# Patient Record
Sex: MALE | Race: WHITE
[De-identification: names, ages, dates, MRNs, and addresses within clinical notes are randomized per-mention and may not be internally consistent; named-entity substitution may affect disease eponyms.]

---

## 2022-11-26 ENCOUNTER — HOSPITAL ENCOUNTER (INPATIENT)
Dept: HOSPITAL 75 - ER | Age: 77
LOS: 4 days | Discharge: HOME | DRG: 186 | End: 2022-11-30
Attending: INTERNAL MEDICINE | Admitting: INTERNAL MEDICINE
Payer: MEDICARE

## 2022-11-26 VITALS — WEIGHT: 243.17 LBS | BODY MASS INDEX: 32.94 KG/M2 | HEIGHT: 72.01 IN

## 2022-11-26 VITALS — DIASTOLIC BLOOD PRESSURE: 74 MMHG | SYSTOLIC BLOOD PRESSURE: 96 MMHG

## 2022-11-26 VITALS — SYSTOLIC BLOOD PRESSURE: 109 MMHG | DIASTOLIC BLOOD PRESSURE: 77 MMHG

## 2022-11-26 VITALS — SYSTOLIC BLOOD PRESSURE: 112 MMHG | DIASTOLIC BLOOD PRESSURE: 77 MMHG

## 2022-11-26 VITALS — DIASTOLIC BLOOD PRESSURE: 95 MMHG | SYSTOLIC BLOOD PRESSURE: 142 MMHG

## 2022-11-26 DIAGNOSIS — J44.1: ICD-10-CM

## 2022-11-26 DIAGNOSIS — M19.91: ICD-10-CM

## 2022-11-26 DIAGNOSIS — Z20.822: ICD-10-CM

## 2022-11-26 DIAGNOSIS — F17.220: ICD-10-CM

## 2022-11-26 DIAGNOSIS — E66.9: ICD-10-CM

## 2022-11-26 DIAGNOSIS — I10: ICD-10-CM

## 2022-11-26 DIAGNOSIS — J90: Primary | ICD-10-CM

## 2022-11-26 DIAGNOSIS — G47.33: ICD-10-CM

## 2022-11-26 DIAGNOSIS — E03.9: ICD-10-CM

## 2022-11-26 DIAGNOSIS — H54.7: ICD-10-CM

## 2022-11-26 DIAGNOSIS — J96.01: ICD-10-CM

## 2022-11-26 DIAGNOSIS — H91.90: ICD-10-CM

## 2022-11-26 LAB
ALBUMIN SERPL-MCNC: 3.3 GM/DL (ref 3.2–4.5)
ALP SERPL-CCNC: 82 U/L (ref 40–136)
ALT SERPL-CCNC: 27 U/L (ref 0–55)
APTT PPP: YELLOW S
ARTERIAL PATENCY WRIST A: (no result)
ARTERIAL PATENCY WRIST A: (no result)
BACTERIA #/AREA URNS HPF: NEGATIVE /HPF
BASE EXCESS STD BLDA CALC-SCNC: 1.5 MMOL/L (ref -2.5–2.5)
BASE EXCESS STD BLDA CALC-SCNC: 1.9 MMOL/L (ref -2.5–2.5)
BASOPHILS # BLD AUTO: 0.1 10^3/UL (ref 0–0.1)
BASOPHILS NFR BLD AUTO: 1 % (ref 0–10)
BDY SITE: (no result)
BDY SITE: (no result)
BILIRUB SERPL-MCNC: 0.7 MG/DL (ref 0.1–1)
BILIRUB UR QL STRIP: NEGATIVE
BODY TEMPERATURE: 36.5
BODY TEMPERATURE: 37
BUN/CREAT SERPL: 17
CALCIUM SERPL-MCNC: 9.3 MG/DL (ref 8.5–10.1)
CHLORIDE SERPL-SCNC: 99 MMOL/L (ref 98–107)
CO2 BLDA CALC-SCNC: 27.2 MMOL/L (ref 21–31)
CO2 BLDA CALC-SCNC: 27.8 MMOL/L (ref 21–31)
CO2 SERPL-SCNC: 19 MMOL/L (ref 21–32)
CREAT SERPL-MCNC: 0.78 MG/DL (ref 0.6–1.3)
EOSINOPHIL # BLD AUTO: 0.6 10^3/UL (ref 0–0.3)
EOSINOPHIL NFR BLD AUTO: 5 % (ref 0–10)
FIBRINOGEN PPP-MCNC: CLEAR MG/DL
GFR SERPLBLD BASED ON 1.73 SQ M-ARVRAT: 93 ML/MIN
GLUCOSE SERPL-MCNC: 84 MG/DL (ref 70–105)
GLUCOSE UR STRIP-MCNC: NEGATIVE MG/DL
HCT VFR BLD CALC: 51 % (ref 40–54)
HGB BLD-MCNC: 17 G/DL (ref 13.3–17.7)
INHALED O2 FLOW RATE: (no result) L/MIN
INHALED O2 FLOW RATE: (no result) L/MIN
KETONES UR QL STRIP: (no result)
LEUKOCYTE ESTERASE UR QL STRIP: NEGATIVE
LYMPHOCYTES # BLD AUTO: 1 10^3/UL (ref 1–4)
LYMPHOCYTES NFR BLD AUTO: 10 % (ref 12–44)
MAGNESIUM SERPL-MCNC: 1.9 MG/DL (ref 1.6–2.4)
MANUAL DIFFERENTIAL PERFORMED BLD QL: NO
MCH RBC QN AUTO: 31 PG (ref 25–34)
MCHC RBC AUTO-ENTMCNC: 34 G/DL (ref 32–36)
MCV RBC AUTO: 92 FL (ref 80–99)
MONOCYTES # BLD AUTO: 1 10^3/UL (ref 0–1)
MONOCYTES NFR BLD AUTO: 9 % (ref 0–12)
NEUTROPHILS # BLD AUTO: 8 10^3/UL (ref 1.8–7.8)
NEUTROPHILS NFR BLD AUTO: 75 % (ref 42–75)
NITRITE UR QL STRIP: NEGATIVE
PCO2 BLDA: 43 MMHG (ref 35–45)
PCO2 BLDA: 44 MMHG (ref 35–45)
PH BLDA: 7.39 [PH] (ref 7.37–7.43)
PH BLDA: 7.4 [PH] (ref 7.37–7.43)
PH UR STRIP: 6 [PH] (ref 5–9)
PLATELET # BLD: 193 10^3/UL (ref 130–400)
PMV BLD AUTO: 10.8 FL (ref 9–12.2)
PO2 BLDA: 153 MMHG (ref 79–93)
PO2 BLDA: 82 MMHG (ref 79–93)
POTASSIUM SERPL-SCNC: 4.7 MMOL/L (ref 3.6–5)
PROT SERPL-MCNC: 7.8 GM/DL (ref 6.4–8.2)
PROT UR QL STRIP: NEGATIVE
RBC #/AREA URNS HPF: (no result) /HPF
SAO2 % BLDA FROM PO2: 100 % (ref 94–100)
SAO2 % BLDA FROM PO2: 98 % (ref 94–100)
SODIUM SERPL-SCNC: 131 MMOL/L (ref 135–145)
SP GR UR STRIP: 1.01 (ref 1.02–1.02)
VENTILATION MODE VENT: NO
VENTILATION MODE VENT: YES
WBC # BLD AUTO: 10.7 10^3/UL (ref 4.3–11)
WBC #/AREA URNS HPF: (no result) /HPF

## 2022-11-26 PROCEDURE — 93041 RHYTHM ECG TRACING: CPT

## 2022-11-26 PROCEDURE — 84145 PROCALCITONIN (PCT): CPT

## 2022-11-26 PROCEDURE — 80048 BASIC METABOLIC PNL TOTAL CA: CPT

## 2022-11-26 PROCEDURE — 85007 BL SMEAR W/DIFF WBC COUNT: CPT

## 2022-11-26 PROCEDURE — 71275 CT ANGIOGRAPHY CHEST: CPT

## 2022-11-26 PROCEDURE — 94660 CPAP INITIATION&MGMT: CPT

## 2022-11-26 PROCEDURE — 94640 AIRWAY INHALATION TREATMENT: CPT

## 2022-11-26 PROCEDURE — 36415 COLL VENOUS BLD VENIPUNCTURE: CPT

## 2022-11-26 PROCEDURE — 80053 COMPREHEN METABOLIC PANEL: CPT

## 2022-11-26 PROCEDURE — 85027 COMPLETE CBC AUTOMATED: CPT

## 2022-11-26 PROCEDURE — 81000 URINALYSIS NONAUTO W/SCOPE: CPT

## 2022-11-26 PROCEDURE — 82805 BLOOD GASES W/O2 SATURATION: CPT

## 2022-11-26 PROCEDURE — 84484 ASSAY OF TROPONIN QUANT: CPT

## 2022-11-26 PROCEDURE — 94761 N-INVAS EAR/PLS OXIMETRY MLT: CPT

## 2022-11-26 PROCEDURE — 5A09357 ASSISTANCE WITH RESPIRATORY VENTILATION, LESS THAN 24 CONSECUTIVE HOURS, CONTINUOUS POSITIVE AIRWAY PRESSURE: ICD-10-PCS | Performed by: INTERNAL MEDICINE

## 2022-11-26 PROCEDURE — 85379 FIBRIN DEGRADATION QUANT: CPT

## 2022-11-26 PROCEDURE — 87081 CULTURE SCREEN ONLY: CPT

## 2022-11-26 PROCEDURE — 83880 ASSAY OF NATRIURETIC PEPTIDE: CPT

## 2022-11-26 PROCEDURE — 87075 CULTR BACTERIA EXCEPT BLOOD: CPT

## 2022-11-26 PROCEDURE — 82945 GLUCOSE OTHER FLUID: CPT

## 2022-11-26 PROCEDURE — 71045 X-RAY EXAM CHEST 1 VIEW: CPT

## 2022-11-26 PROCEDURE — 83735 ASSAY OF MAGNESIUM: CPT

## 2022-11-26 PROCEDURE — 87205 SMEAR GRAM STAIN: CPT

## 2022-11-26 PROCEDURE — 84157 ASSAY OF PROTEIN OTHER: CPT

## 2022-11-26 PROCEDURE — 86141 C-REACTIVE PROTEIN HS: CPT

## 2022-11-26 PROCEDURE — 89051 BODY FLUID CELL COUNT: CPT

## 2022-11-26 PROCEDURE — 87070 CULTURE OTHR SPECIMN AEROBIC: CPT

## 2022-11-26 PROCEDURE — 85025 COMPLETE CBC W/AUTO DIFF WBC: CPT

## 2022-11-26 PROCEDURE — 93005 ELECTROCARDIOGRAM TRACING: CPT

## 2022-11-26 PROCEDURE — 87636 SARSCOV2 & INF A&B AMP PRB: CPT

## 2022-11-26 RX ADMIN — SENNOSIDES SCH MG: 8.6 TABLET, FILM COATED ORAL at 20:57

## 2022-11-26 RX ADMIN — ALBUTEROL SULFATE SCH MG: 2.5 SOLUTION RESPIRATORY (INHALATION) at 22:32

## 2022-11-26 RX ADMIN — DOCUSATE SODIUM SCH MG: 100 CAPSULE ORAL at 20:57

## 2022-11-26 RX ADMIN — ENOXAPARIN SODIUM SCH MG: 100 INJECTION SUBCUTANEOUS at 20:35

## 2022-11-26 NOTE — DIAGNOSTIC IMAGING REPORT
EXAMINATION: CT angiography of the chest.



TECHNIQUE: Contrast enhanced thin section helical images were

obtained through the chest with intravenous contrast timed for

the optimal opacification of the arterial structures per CTA

protocol. Post-processing, reconstructions and interpretation of

angiographic images of the vessels was performed. 3D MIP

reconstructions were performed and reviewed. All CT scans use one

or more of the following dose optimizing techniques: automated

exposure control, MA and/or KvP adjustment based on patient size

and exam type or iterative reconstruction. 



HISTORY: Shortness of breath.



COMPARISON: None available.



FINDINGS: 



Vascular: No filling defect within the pulmonary arteries.

Thoracic aorta is normal in caliber. Calcification of the aorta

and coronary vessels.



Thyroid: The thyroid is normal.



Mediastinum: Heart size is normal without significant pericardial

effusion. No suspicious lymphadenopathy.



Lungs and airways: There is a large right pleural effusion with

adjacent atelectasis. No pneumothorax. Mild atelectasis within

the right lung base. The airways are normal.



Upper abdomen: The subphrenic structures are normal. 



Musculoskeletal: Degenerative changes of the spine without

suspicious osseous lesion or compression fracture.



IMPRESSION:

1. Large right pleural effusion with adjacent atelectasis.

2. No finding of pulmonary embolus.



Dictated by: 



  Dictated on workstation # [id number]

## 2022-11-26 NOTE — TELE-ICU CONSULT
Progress Note


76 y/o male with hx of COPD presents with SOB and hypoxemia ( O2 sat 64%)


Started on steroids, and nebs





CXR: here is elevation of the right hemidiaphragm with airspace


opacity in the right lung base. There may be a small right


pleural effusion. No pneumothorax is present. The cardiac


silhouette is normal in size. There is aortic atherosclerosis.





IMPRESSION:


1. Right basilar airspace opacity, may represent atelectasis or


infiltrate.


2. Elevation the right hemidiaphragm versus small pleural


effusion.





CTA chest: no PE





IMP: acute exaccerbation of COPD





Focused Exam


Height, Weight, BMI


Height: '"


Weight: lbs. oz. kg; 35.00 BMI


Method:





Labs


Laboratory Tests


11/26/22 13:30











Results


Results/Procedures


Lab


Laboratory Tests


11/26/22 13:30











Results


Labs


Labs


Laboratory Tests


11/26/22 12:51: 


Influenza Type A (RT-PCR) Not Detected, Influenza Type B (RT-PCR) Not Detected, 

SARS-CoV-2 RNA (RT-PCR) Not Detected


11/26/22 13:20: 


Blood Gas Puncture Site RT RAD, Blood Gas Patient Temperature 37, Arterial Blood

pH 7.40, Arterial Blood Partial Pressure CO2 43, Arterial Blood Partial Pressure

O2 153H, Arterial Blood HCO3 26, Arterial Blood Total CO2 27.2, Arterial Blood 

Oxygen Saturation 100, Arterial Blood Base Excess 1.5, Corey Test YES-POS, Blood

Gas Ventilator Setting YES, Blood Gas Inspired Oxygen 60%


11/26/22 13:30: 


White Blood Count 10.7, Red Blood Count 5.53H, Hemoglobin 17.0, Hematocrit 51, M

nisha Corpuscular Volume 92, Mean Corpuscular Hemoglobin 31, Mean Corpuscular 

Hemoglobin Concent 34, Red Cell Distribution Width 13.2, Platelet Count 193, 

Mean Platelet Volume 10.8, Immature Granulocyte % (Auto) 1, Neutrophils (%) 

(Auto) 75, Lymphocytes (%) (Auto) 10L, Monocytes (%) (Auto) 9, Eosinophils (%) 

(Auto) 5, Basophils (%) (Auto) 1, Neutrophils # (Auto) 8.0H, Lymphocytes # 

(Auto) 1.0, Monocytes # (Auto) 1.0, Eosinophils # (Auto) 0.6H, Basophils # 

(Auto) 0.1, Immature Granulocyte # (Auto) 0.1, D-Dimer 3.19H, Sodium Level 131L,

Potassium Level 4.7, Chloride Level 99, Carbon Dioxide Level 19L, Anion Gap 13, 

Blood Urea Nitrogen 13, Creatinine 0.78, Estimat Glomerular Filtration Rate 93, 

BUN/Creatinine Ratio 17, Glucose Level 84, Calcium Level 9.3, Corrected Calcium 

9.9, Magnesium Level 1.9, Total Bilirubin 0.7, Aspartate Amino Transf (AST/SGOT)

41H, Alanine Aminotransferase (ALT/SGPT) 27, Alkaline Phosphatase 82, Troponin I

0.072H, C-Reactive Protein High Sensitivity 5.90H, B-Type Natriuretic Peptide 

87.7, Total Protein 7.8, Albumin 3.3, Procalcitonin 0.04


11/26/22 16:41: Troponin I 0.080H











RODRIGO PARRISH MD            Nov 26, 2022 17:28

## 2022-11-26 NOTE — DIAGNOSTIC IMAGING REPORT
HISTORY: Shortness of air



COMPARISON: None



TECHNIQUE: Frontal view of the chest.



FINDINGS:



There is elevation of the right hemidiaphragm with airspace

opacity in the right lung base. There may be a small right

pleural effusion. No pneumothorax is present. The cardiac

silhouette is normal in size. There is aortic atherosclerosis.



IMPRESSION:

1. Right basilar airspace opacity, may represent atelectasis or

infiltrate.

2. Elevation the right hemidiaphragm versus small pleural

effusion.



Dictated by: 



  Dictated on workstation # [id number]

## 2022-11-26 NOTE — ED GENERAL
General


Chief Complaint:  Respiratory Problems


Stated Complaint:  SOA


Nursing Triage Note:  


PT TO RM 7 PT CO OF SOA, PT IN RESP DISTRESS O2 SAT RM AIR 64%, RR 33. PT PLACE 


ON O2 @ 10 PER OXY MASK. PT STATES HAS BEEN SICK SINCE WEDNESDAY. PT IS FROM 


[facility]. HAS HX COPD


Source of Information:  Patient


Exam Limitations:  No Limitations





History of Present Illness


Date Seen by Provider:  2022


Time Seen by Provider:  12:50


Initial Comments


Mr. Luna is a 77-year-old gentleman who presents to the emergency room in 

respiratory distress.  On initial assessment he is moving air poorly, employing 

accessory muscle use, and has an oxygen saturation of 64%.  He was immediately 

placed on a high flow mask.  He is visiting the area for the holiday and 

normally lives in the [facility].  He uses the [place].  He 

describes history of COPD, obstructive sleep apnea, hypertension, and 

hypothyroidism.  He does not require oxygen use at home.  He has used DuoNeb in 

both the inhaled and nebulized form today.  He has not felt well for about the 

past 4 days.  He denies cough or fever.  He denies chest pain.





Allergies and Home Medications


Allergies


Coded Allergies:  


     No Known Drug Allergies (Unverified , 22)





Patient Home Medication List


Home Medication List Reviewed:  Yes





Review of Systems


Review of Systems


Constitutional:  no symptoms reported


EENTM:  no symptoms reported


Respiratory:  see HPI


Cardiovascular:  no symptoms reported


Gastrointestinal:  no symptoms reported


Genitourinary:  no symptoms reported


Musculoskeletal:  no symptoms reported


Skin:  no symptoms reported


Psychiatric/Neurological:  No Symptoms Reported


Hematologic/Lymphatic:  No Symptoms Reported


Immunological/Allergic:  no symptoms reported





Past Medical-Social-Family Hx


Patient Social History


Tobacco Use?:  No


Smoking Status:  Former Smoker


Use of E-Cig and/or Vaping dev:  No


Substance use?:  No


Alcohol Use?:  No





Past Medical History


Surgeries:  Yes


Gallbladder


Respiratory:  Yes


Sleep Apnea, COPD


Currently Using CPAP:  Yes


Cardiac:  Yes


Hypertension


Neurological:  No


Genitourinary:  No


Gastrointestinal:  No


Musculoskeletal:  No


Endocrine:  Yes


Hypothyroidsim


HEENT:  No


Psychosocial:  No





Physical Exam


Vital Signs





Vital Signs - First Documented








 22





 12:50


 


Temp 36.2


 


Pulse 86


 


Resp 33


 


B/P (MAP) 169/101 (123)


 


Pulse Ox 91


 


O2 Delivery Nasal Cannula


 


O2 Flow Rate 5.00





Capillary Refill : Less Than 3 Seconds


Height, Weight, BMI


Height: '"


Weight: lbs. oz. kg; 35.00 BMI


Method:


General Appearance:  WD/WN, Moderate Distress


HEENT:  PERRL/EOMI, Normal ENT Inspection


Neck:  Normal Inspection; No JVD


Respiratory:  Accessory Muscle Use, Decreased Breath Sounds, Respiratory 

Distress, Wheezing


Cardiovascular:  Regular Rate, Rhythm, No Edema, No Murmur


Gastrointestinal:  Non Tender, Soft


Extremity:  Normal Inspection, Non Tender, No Pedal Edema


Neurologic/Psychiatric:  Alert, Oriented x3, No Motor/Sensory Deficits, Normal 

Mood/Affect, Other (Cognition initially rather dulled but improved rapidly with 

oxygenation)


Skin:  Warm/Dry, Pallor





Progress/Results/Core Measures


Suspected Sepsis


SIRS


Temperature: 


Pulse: 74 


Respiratory Rate: 30


 


Laboratory Tests


22 13:30: White Blood Count 10.7


Blood Pressure 142 /95 


Mean: 123


 


Laboratory Tests


22 13:30: 


Creatinine 0.78, Platelet Count 193, Total Bilirubin 0.7








Results/Orders


Lab Results





Laboratory Tests








Test


 22


12:51 22


13:20 22


13:30 22


16:41 Range/Units


 


 


Influenza Type A (RT-PCR) Not Detected     Not Detecte  


 


Influenza Type B (RT-PCR) Not Detected     Not Detecte  


 


SARS-CoV-2 RNA (RT-PCR) Not Detected     Not Detecte  


 


Blood Gas Puncture Site  RT RAD     


 


Blood Gas Patient Temperature  37     


 


Arterial Blood pH  7.40    7.37-7.43  


 


Arterial Blood Partial


Pressure CO2 


 43 


 


 


 35-45  MMHG





 


Arterial Blood Partial


Pressure O2 


 153 H


 


 


 79-93  MMHG





 


Arterial Blood HCO3  26    23-27  MMOL/L


 


Arterial Blood Total CO2


 


 27.2 


 


 


 21.0-31.0


MMOL/L


 


Arterial Blood Oxygen


Saturation 


 100 


 


 


   %





 


Arterial Blood Base Excess


 


 1.5 


 


 


 -2.5-2.5


MMOL/L


 


Corey Test  YES-POS     


 


Blood Gas Ventilator Setting  YES     


 


Blood Gas Inspired Oxygen  60%     


 


White Blood Count


 


 


 10.7 


 


 4.3-11.0


10^3/uL


 


Red Blood Count


 


 


 5.53 H


 


 4.30-5.52


10^6/uL


 


Hemoglobin   17.0   13.3-17.7  g/dL


 


Hematocrit   51   40-54  %


 


Mean Corpuscular Volume   92   80-99  fL


 


Mean Corpuscular Hemoglobin   31   25-34  pg


 


Mean Corpuscular Hemoglobin


Concent 


 


 34 


 


 32-36  g/dL





 


Red Cell Distribution Width   13.2   10.0-14.5  %


 


Platelet Count


 


 


 193 


 


 130-400


10^3/uL


 


Mean Platelet Volume   10.8   9.0-12.2  fL


 


Immature Granulocyte % (Auto)   1    %


 


Neutrophils (%) (Auto)   75   42-75  %


 


Lymphocytes (%) (Auto)   10 L  12-44  %


 


Monocytes (%) (Auto)   9   0-12  %


 


Eosinophils (%) (Auto)   5   0-10  %


 


Basophils (%) (Auto)   1   0-10  %


 


Neutrophils # (Auto)


 


 


 8.0 H


 


 1.8-7.8


10^3/uL


 


Lymphocytes # (Auto)


 


 


 1.0 


 


 1.0-4.0


10^3/uL


 


Monocytes # (Auto)


 


 


 1.0 


 


 0.0-1.0


10^3/uL


 


Eosinophils # (Auto)


 


 


 0.6 H


 


 0.0-0.3


10^3/uL


 


Basophils # (Auto)


 


 


 0.1 


 


 0.0-0.1


10^3/uL


 


Immature Granulocyte # (Auto)


 


 


 0.1 


 


 0.0-0.1


10^3/uL


 


D-Dimer


 


 


 3.19 H


 


 0.00-0.49


UG/ML


 


Sodium Level   131 L  135-145  MMOL/L


 


Potassium Level   4.7   3.6-5.0  MMOL/L


 


Chloride Level   99     MMOL/L


 


Carbon Dioxide Level   19 L  21-32  MMOL/L


 


Anion Gap   13   5-14  MMOL/L


 


Blood Urea Nitrogen   13   7-18  MG/DL


 


Creatinine


 


 


 0.78 


 


 0.60-1.30


MG/DL


 


Estimat Glomerular Filtration


Rate 


 


 93 


 


  





 


BUN/Creatinine Ratio   17    


 


Glucose Level   84     MG/DL


 


Calcium Level   9.3   8.5-10.1  MG/DL


 


Corrected Calcium   9.9   8.5-10.1  MG/DL


 


Magnesium Level   1.9   1.6-2.4  MG/DL


 


Total Bilirubin   0.7   0.1-1.0  MG/DL


 


Aspartate Amino Transf


(AST/SGOT) 


 


 41 H


 


 5-34  U/L





 


Alanine Aminotransferase


(ALT/SGPT) 


 


 27 


 


 0-55  U/L





 


Alkaline Phosphatase   82     U/L


 


Troponin I   0.072 H 0.080 H <0.028  NG/ML


 


C-Reactive Protein High


Sensitivity 


 


 5.90 H


 


 0.00-0.50


MG/DL


 


B-Type Natriuretic Peptide   87.7   <100.0  PG/ML


 


Total Protein   7.8   6.4-8.2  GM/DL


 


Albumin   3.3   3.2-4.5  GM/DL


 


Procalcitonin   0.04   <0.10  NG/ML








My Orders





Orders - JIAN SANTOS MD


Covid 19 Inhouse Test (22 12:46)


Influenza A And B By Pcr (22 12:46)


Bnp [place] (22 13:00)


Cbc With Automated Diff (22 13:00)


Comprehensive Metabolic Panel (22 13:00)


Hs C Reactive Protein (22 13:00)


Magnesium (22 13:00)


Procalcitonin (Pct) (22 13:00)


Ed Iv/Invasive Line Start (22 13:00)


Ekg Tracing (22 13:00)


O2 (22 13:00)


Monitor-Rhythm Ecg Trace Only (22 13:00)


Fibrin Degradation Products (22 13:00)


Troponin I Wake (22 13:00)


Methylprednisolone Sod Succ (Solu-Medrol (22 13:00)


Albuterol/Ipra Inhalation Soln (Duoneb I (22 13:00)


Svn Small Volume Nebulizer (22 13:00)


Albuterol/Ipra Inhalation Soln (Duoneb I (22 13:03)


Arterial Blood Gas (22 13:15)


Chest 1 View, Ap/Pa Only (22 13:42)


Ct Angio Chest W (22 14:54)


Iohexol Injection (Omnipaque 350 Mg/Ml 1 (22 15:00)


Received Contrast (Hold Metformin- Contr (22 15:00)


Ns (Ivpb) (Sodium Chloride 0.9% Ivpb Bag (22 15:00)


Troponin I Kristyn (22 15:30)


Aspirin Chewable Tablet (Baby Aspirin Ch (22 17:00)


Furosemide  Injection (Lasix  Injection) (22 17:00)


Potassium Chloride (Tablet) (Klor Con Ta (22 17:00)





Medications Given in ED





Current Medications








 Medications  Dose


 Ordered  Sig/Jose David


 Route  Start Time


 Stop Time Status Last Admin


Dose Admin


 


 Albuterol/


 Ipratropium  3 ml  ONCE  ONCE


 INH  22 13:00


 22 13:06 DC 22 13:12


3 ML


 


 Iohexol  100 ml  ONCE  ONCE


 IV  22 15:00


 22 15:01 DC 22 15:35


84 ML


 


 Methylprednisolone


 Sodium Succinate  125 mg  ONCE  ONCE


 IVP  22 13:00


 22 13:06 DC 22 13:38


125 MG


 


 Sodium Chloride  100 ml  ONCE  ONCE


 IV  22 15:00


 22 15:01 DC 22 15:35


100 ML








Vital Signs/I&O











 22





 12:50 12:50 12:50 13:04


 


Temp   36.2 


 


Pulse   86 74


 


Resp   33 30


 


B/P (MAP)   169/101 (123) 


 


Pulse Ox  91 95 99


 


O2 Delivery Nasal Cannula Nasal Cannula OxyMask 


 


O2 Flow Rate 5.00 3.00  60.00





Capillary Refill : Less Than 3 Seconds








Blood Pressure Mean:                    123








Progress Note :  


Progress Note


Patient was immediately placed on high flow oxygen and shortly thereafter on 

BiPAP.  His oxygenation resuscitated quickly.  He was found to have a large 

right pleural effusion which is not previously known to him.  No specific cause 

was identified for this unilateral pleural effusion.  He likely has some degree 

of COPD exacerbation, but the pleural effusion is likely the primary cause of h

is respiratory failure.  He was treated with DuoNeb and Solu-Medrol.  He 

remained on the BiPAP in stable condition throughout the rest of his ER stay.  

D-dimer was elevated which prompted CT angiogram.  No pulmonary embolus was 

identified.  Dr. Strickland was consulted to perform thoracentesis.  Fluid should be

tested as he has not had thoracentesis in the past and there is no known cause 

for his pleural effusion.  No infectious etiology was identified.  Patient 

desires to have full CODE STATUS.  A low-dose of Lasix was administered with 

accompanying potassium to prevent further accumulation of pleural effusion.  Dr. Strickland plans to perform thoracentesis in the morning unless patient 

decompensates necessitating emergent thoracentesis.  Patient had a slight eleva

tion in troponin but repeat was stable.





ECG


Initial ECG Impression Date:  2022


Initial ECG Impression Time:  13:15


Initial ECG Rate:  74


Initial ECG Rhythm:  Normal Sinus


Comment


Sinus rhythm with no ST elevation or depression.  No abnormal intervals or axis 

deviation.  PVC noted.





Diagnostic Imaging





   Diagonstic Imaging:  Xray


   Plain Films/CT/US/NM/MRI:  chest


Comments


NAME:   CRISTINA LUNA


[facility] REC#:   [id number]


ACCOUNT#:   [id number]


PT STATUS:   REG ER


:   1945


PHYSICIAN:   JIAN SANTOS MD


ADMIT DATE:   22/ER


                                  ***Signed***


Date of Exam:22





CHEST 1 VIEW, AP/PA ONLY








HISTORY: Shortness of air





COMPARISON: None





TECHNIQUE: Frontal view of the chest.





FINDINGS:





There is elevation of the right hemidiaphragm with airspace


opacity in the right lung base. There may be a small right


pleural effusion. No pneumothorax is present. The cardiac


silhouette is normal in size. There is aortic atherosclerosis.





IMPRESSION:


1. Right basilar airspace opacity, may represent atelectasis or


infiltrate.


2. Elevation the right hemidiaphragm versus small pleural


effusion.





Dictated by: 





  Dictated on workstation # [id number]








Dict:   22 1402


Trans:   22 1409


[facility] [id number]





Interpreted by:     YOSHI HERNANDEZ MD


Electronically signed by: YOSHI HERNANDEZ MD 22 1409








   Diagonstic Imaging:  CT


   Plain Films/CT/US/NM/MRI:  chest


Comments


CT angiogram of the chest viewed by me and report reviewed.  See report below:





NAME:   CRISTINA LUNA


[facility] REC#:   [id number]


ACCOUNT#:   [id number]


PT STATUS:   REG ER


:   1945


PHYSICIAN:   JIAN SANTOS MD


ADMIT DATE:   22/ER


***Signed***


Date of Exam:22





CT ANGIO CHEST W








EXAMINATION: CT angiography of the chest.





TECHNIQUE: Contrast enhanced thin section helical images were


obtained through the chest with intravenous contrast timed for


the optimal opacification of the arterial structures per CTA


protocol. Post-processing, reconstructions and interpretation of


angiographic images of the vessels was performed. 3D MIP


reconstructions were performed and reviewed. All CT scans use one


or more of the following dose optimizing techniques: automated


exposure control, MA and/or KvP adjustment based on patient size


and exam type or iterative reconstruction. 





HISTORY: Shortness of breath.





COMPARISON: None available.





FINDINGS: 





Vascular: No filling defect within the pulmonary arteries.


Thoracic aorta is normal in caliber. Calcification of the aorta


and coronary vessels.





Thyroid: The thyroid is normal.





Mediastinum: Heart size is normal without significant pericardial


effusion. No suspicious lymphadenopathy.





Lungs and airways: There is a large right pleural effusion with


adjacent atelectasis. No pneumothorax. Mild atelectasis within


the right lung base. The airways are normal.





Upper abdomen: The subphrenic structures are normal. 





Musculoskeletal: Degenerative changes of the spine without


suspicious osseous lesion or compression fracture.





IMPRESSION:


1. Large right pleural effusion with adjacent atelectasis.


2. No finding of pulmonary embolus.





Dictated by: 





  Dictated on workstation # [id number]








Dict:   22 1547


Trans:   22 1556


[facility] 7716-9499





Interpreted by:     CRISTINA CORCORAN DO


Electronically signed by: CRISTINA CORCORAN DO 22 1556





Departure


Communication (Admissions)


Time/Spoke to Admitting Phy:  16:42


Dr. Niki Davis at 1650


Dr. Strickland at 1630





Impression





   Primary Impression:  


   Acute respiratory failure with hypoxia


   Additional Impressions:  


   Pleural effusion, right


   COPD exacerbation


Disposition:   ADMITTED AS INPATIENT


Condition:  Stable





Admissions


Decision to Admit Reason:  Admit from ER (General)


Decision to Admit/Date:  2022


Time/Decision to Admit Time:  16:42





Departure-Patient Inst.


Referrals:  


NO,LOCAL PHYSICIAN (PCP/Family)


Primary Care Physician











JIAN SANTOS MD        2022 16:40

## 2022-11-27 VITALS — DIASTOLIC BLOOD PRESSURE: 69 MMHG | SYSTOLIC BLOOD PRESSURE: 102 MMHG

## 2022-11-27 VITALS — SYSTOLIC BLOOD PRESSURE: 116 MMHG | DIASTOLIC BLOOD PRESSURE: 70 MMHG

## 2022-11-27 VITALS — DIASTOLIC BLOOD PRESSURE: 96 MMHG | SYSTOLIC BLOOD PRESSURE: 155 MMHG

## 2022-11-27 LAB
APPEARANCE FLD: (no result)
BASOPHILS # BLD AUTO: 0 10^3/UL (ref 0–0.1)
BASOPHILS NFR BLD AUTO: 0 % (ref 0–10)
BODY FLUID SOURCE: (no result)
BUN/CREAT SERPL: 20
CALCIUM SERPL-MCNC: 9.4 MG/DL (ref 8.5–10.1)
CHLORIDE SERPL-SCNC: 96 MMOL/L (ref 98–107)
CO2 SERPL-SCNC: 20 MMOL/L (ref 21–32)
COLOR FLD: YELLOW
CREAT SERPL-MCNC: 0.96 MG/DL (ref 0.6–1.3)
EOSINOPHIL # BLD AUTO: 0 10^3/UL (ref 0–0.3)
EOSINOPHIL NFR BLD AUTO: 0 % (ref 0–10)
GFR SERPLBLD BASED ON 1.73 SQ M-ARVRAT: 82 ML/MIN
GLUCOSE FLD-MCNC: 127 MG/DL
GLUCOSE SERPL-MCNC: 120 MG/DL (ref 70–105)
HCT VFR BLD CALC: 52 % (ref 40–54)
HGB BLD-MCNC: 17.4 G/DL (ref 13.3–17.7)
LYMPHOCYTES # BLD AUTO: 0.5 10^3/UL (ref 1–4)
LYMPHOCYTES NFR BLD AUTO: 3 % (ref 12–44)
MAGNESIUM SERPL-MCNC: 2.1 MG/DL (ref 1.6–2.4)
MANUAL DIFFERENTIAL PERFORMED BLD QL: YES
MCH RBC QN AUTO: 30 PG (ref 25–34)
MCHC RBC AUTO-ENTMCNC: 33 G/DL (ref 32–36)
MCV RBC AUTO: 91 FL (ref 80–99)
MONOCYTES # BLD AUTO: 0.5 10^3/UL (ref 0–1)
MONOCYTES NFR BLD AUTO: 3 % (ref 0–12)
MONOCYTES NFR BLD: 5 %
NEUTROPHILS # BLD AUTO: 16.1 10^3/UL (ref 1.8–7.8)
NEUTROPHILS NFR BLD AUTO: 94 % (ref 42–75)
NEUTS BAND NFR BLD MANUAL: 91 %
PLATELET # BLD: 206 10^3/UL (ref 130–400)
PMV BLD AUTO: 11.4 FL (ref 9–12.2)
POTASSIUM SERPL-SCNC: 4.4 MMOL/L (ref 3.6–5)
PROT FLD-MCNC: 4.9 G/DL
RBC # FLD: 0.01 10^6/UL
RBC MORPH BLD: NORMAL
SODIUM SERPL-SCNC: 132 MMOL/L (ref 135–145)
VARIANT LYMPHS NFR BLD MANUAL: 4 %
WBC # BLD AUTO: 17.2 10^3/UL (ref 4.3–11)
WBC # FLD: 4.66 10^3/UL

## 2022-11-27 PROCEDURE — 5A0935A ASSISTANCE WITH RESPIRATORY VENTILATION, LESS THAN 24 CONSECUTIVE HOURS, HIGH NASAL FLOW/VELOCITY: ICD-10-PCS | Performed by: SURGERY

## 2022-11-27 PROCEDURE — 0W993ZZ DRAINAGE OF RIGHT PLEURAL CAVITY, PERCUTANEOUS APPROACH: ICD-10-PCS | Performed by: SURGERY

## 2022-11-27 RX ADMIN — SENNOSIDES SCH MG: 8.6 TABLET, FILM COATED ORAL at 19:33

## 2022-11-27 RX ADMIN — ALBUTEROL SULFATE SCH MG: 2.5 SOLUTION RESPIRATORY (INHALATION) at 15:00

## 2022-11-27 RX ADMIN — ALBUTEROL SULFATE SCH MG: 2.5 SOLUTION RESPIRATORY (INHALATION) at 10:58

## 2022-11-27 RX ADMIN — SENNOSIDES SCH MG: 8.6 TABLET, FILM COATED ORAL at 14:44

## 2022-11-27 RX ADMIN — ALBUTEROL SULFATE SCH MG: 2.5 SOLUTION RESPIRATORY (INHALATION) at 06:48

## 2022-11-27 RX ADMIN — ALBUTEROL SULFATE SCH MG: 2.5 SOLUTION RESPIRATORY (INHALATION) at 02:56

## 2022-11-27 RX ADMIN — DOCUSATE SODIUM SCH MG: 100 CAPSULE ORAL at 14:44

## 2022-11-27 RX ADMIN — ENOXAPARIN SODIUM SCH MG: 100 INJECTION SUBCUTANEOUS at 20:39

## 2022-11-27 RX ADMIN — ALBUTEROL SULFATE SCH MG: 2.5 SOLUTION RESPIRATORY (INHALATION) at 18:58

## 2022-11-27 RX ADMIN — POTASSIUM CHLORIDE SCH MLS/HR: 200 INJECTION, SOLUTION INTRAVENOUS at 06:01

## 2022-11-27 RX ADMIN — POTASSIUM CHLORIDE SCH MEQ: 1500 TABLET, EXTENDED RELEASE ORAL at 06:01

## 2022-11-27 RX ADMIN — DOCUSATE SODIUM SCH MG: 100 CAPSULE ORAL at 19:33

## 2022-11-27 RX ADMIN — MAGNESIUM SULFATE IN DEXTROSE SCH MLS/HR: 10 INJECTION, SOLUTION INTRAVENOUS at 06:01

## 2022-11-27 RX ADMIN — ALBUTEROL SULFATE SCH MG: 2.5 SOLUTION RESPIRATORY (INHALATION) at 22:25

## 2022-11-27 NOTE — DIAGNOSTIC IMAGING REPORT
EXAMINATION: Chest 1 view



HISTORY: Postthoracentesis



COMPARISON: 11/26/2022



FINDINGS: 



Right pleural effusion is decreased in size. No pneumothorax. No

edema or pneumonia. Heart size is normal.



IMPRESSION: 



1. Decrease in size if right pleural effusion. No pneumothorax.



Dictated by: 



  Dictated on workstation # [id number]

## 2022-11-27 NOTE — TELE-ICU PROGRESS NOTE
Subjective


Date Seen by a Provider:  Nov 27, 2022


Time Seen by a Provider:  10:13


Subjective/Events-last exam


(Tele-ICU Physician , Progress Note )


Service provided via interactive audio and video telecommunications  E-CARE 

system to a patient admitted to ICU bed in [facility].





Available chart/ vitals / labs / Images reviewed


Video assessment done using   teleICU camera, rest of exam as per RN


Discussed with RN


Events overnight : 





Afebrile


hemodynamically stable


Respiratory - 


I/O = 40% 





Drips: 


Pressors- no





Patient is seen today due to persistent   


and new 





A/P


Acute resp failure ) no pe , large effusion on right  with atelectasis vs 

infiltrate - small


- on BIPAP 14/6 40 % , rr 32  - AAO 


- attempt to wean 


-possible thora today , SX consulted 





Right basilar airspace opacity/  Large right pleural effusion


- etiology is not clear , agree with thora , await w/up 





COPD ( no sign emphesma on CT 


- ? exacrbation 


 - as per bedside 


- received one dose of steroids in ER 








GREYSON


- reported 


 - ? on CPAP at home 





Leucocytosis today 


 - ? steroids related bs  infection  - off abx now 


- neg flyu and covid 














Lines :      , (Central Line Necessity Reviewed)


Larose:


OG:


Nutrition:


Analgesia:


Anxiety/ delirium





VTE Prophylaxis: lov 40 


Stress Ulcer Prophylaxis: na








Plans in collaboration with   bedside consultants and IM MDs.


Discussed with RN to reach out if any questions or concerns








A total of 32  minutes of critical care time was devoted to this patient today, 

required to treat and/or prevent further deterioration of critical care 

condition ( as above ) .











I am remotely monitoring this patient from another state.  I am unable to do the

bedside exam, and history/physical and pertinent information is taken from other

notes in the computer and bedside staff. 


+





Sepsis Event


Evaluation


Height, Weight, BMI


Height: '"


Weight: lbs. oz. kg; 33.65 BMI


Method:





Exam


Exam


Patient acknowledged, consented, and participated in this virtual visit which 

was conducted using real time audio/video


Vital Signs








  Date Time  Temp Pulse Resp B/P (MAP) Pulse Ox O2 Delivery O2 Flow Rate FiO2


 


11/27/22 09:00  78 16 96/76 (83) 94 High Flow N/C 6.00 


 


11/27/22 08:00  84 23 110/75 (87) 94 High Flow N/C 6.00 


 


11/27/22 07:23 36.3       


 


11/27/22 07:00  83 19 108/69 (82) 95 High Flow N/C 6.00 


 


11/27/22 07:00  83      


 


11/27/22 06:48  79 16  94  40.00 


 


11/27/22 06:00  80 20 112/66 (81) 92 High Flow N/C 6.00 


 


11/27/22 05:05 36.8       


 


11/27/22 05:00  85 12 106/75 (85) 93 High Flow N/C 6.00 


 


11/27/22 04:05 36.8       


 


11/27/22 04:00  79 12 106/81 (89) 92 High Flow N/C 6.00 


 


11/27/22 04:00     94 High Flow N/C 6.00 


 


11/27/22 03:11      High Flow N/C 6.00 


 


11/27/22 03:00  75 15 133/91 (105) 94 NIV Bilevel 40.00 


 


11/27/22 02:56  75 16  95  40.00 


 


11/27/22 02:00  80 31 155/81 (105) 96 NIV Bilevel 40.00 


 


11/27/22 01:00  70 31 151/79 (103) 98 NIV Bilevel 40.00 


 


11/27/22 01:00  70      


 


11/27/22 00:00  79 29 122/79 (93) 94 NIV Bilevel 40.00 


 


11/26/22 23:59     94 NIV Bilevel  40


 


11/26/22 23:00  89 25 134/92 (106) 97 NIV Bilevel 40.00 


 


11/26/22 22:33  68 16  95  40.00 


 


11/26/22 22:00  71 18 103/75 (84) 94 NIV Bilevel 40.00 


 


11/26/22 21:00  82 17 105/69 (81) 94 NIV Bilevel 40.00 


 


11/26/22 20:56  81 23  95  40.00 


 


11/26/22 20:00  79 19 111/77 (88) 95 NIV Bilevel 40.00 


 


11/26/22 20:00     94 NIV Bilevel  40


 


11/26/22 19:39 37.0 74   90   40


 


11/26/22 19:00  80      


 


11/26/22 19:00  72 22 111/73 (86) 95 NIV Bilevel 40.00 


 


11/26/22 18:15     90 NIV Bilevel 40.00 


 


11/26/22 18:00  74      


 


11/26/22 18:00  72  109/77 (88) 90 NIV Bilevel 40.00 


 


11/26/22 17:39 37.0 75 30 130/97 (108) 93 NIV Bilevel 40.00 


 


11/26/22 17:27 36.5 84 18 134/78 98 NIV Bilevel  


 


11/26/22 17:02  75 30  96  40.00 


 


11/26/22 13:04  74 30  99  60.00 


 


11/26/22 12:50 36.2 86 33 169/101 (123) 95 OxyMask  


 


11/26/22 12:50     91 Nasal Cannula 3.00 


 


11/26/22 12:50      Nasal Cannula 5.00 














I & O 


 


 11/27/22





 07:00


 


Intake Total 250 ml


 


Output Total 1405 ml


 


Balance -1155 ml








Height & Weight


Height: '"


Weight: lbs. oz. kg; 33.65 BMI


Method:


General Appearance:  WD/WN, Moderate Distress


HEENT:  PERRL/EOMI, Normal ENT Inspection


Neck:  Normal Inspection; No JVD


Respiratory:  Accessory Muscle Use, Decreased Breath Sounds, Respiratory 

Distress, Wheezing


Cardiovascular:  Regular Rate, Rhythm, No Edema, No Murmur


Capillary Refill:  Less Than 3 Seconds


Extremity:  Normal Inspection, Non Tender, No Pedal Edema


Neurologic/Psychiatric:  Alert, Oriented x3, No Motor/Sensory Deficits, Normal 

Mood/Affect, Other (Cognition initially rather dulled but improved rapidly with 

oxygenation)


Skin:  Warm/Dry, Pallor





Results


Lab


Laboratory Tests


11/26/22 13:30








11/27/22 04:03











Assessment/Plan


Assessment/Plan


1











KERRI MOSES MD         Nov 27, 2022 10:14

## 2022-11-27 NOTE — HISTORY & PHYSICAL-HOSPITALIST
History of Present Illness


HPI/Chief Complaint


Carlos Luna is a 77 year old male with PMH HTN, COPD, GREYSON, hypothyroidism, 

obesity, who presented with shortness of breath. He does not use oxygen at 

baseline. He has been having worsening breathing trouble for several days. He 

has had a cough. He denies fevers and chills. He denies chest pain. He denies 

abdominal pain, nausea, and vomiting. He has never had a pleural effusion. [facility] 

[facility]


Source:  patient


Exam Limitations:  no limitations


Date Seen


11/27/22


Time Seen by a Provider:  09:50


Attending Physician


No,Local Physician


PCP


Admitting Physician:


Yonatan Whaley MD 








Attending Physician:


Yonatan Whaley MD


Referring Physician





Date of Admission


Nov 26, 2022 at 18:08





Home Medications & Allergies


Home Medications


Reviewed patient Home Medication Reconciliation performed by pharmacy medication

reconciliations technician and/or nursing.


Patients Allergies have been reviewed.





Allergies





Allergies


Coded Allergies


  No Known Drug Allergies ([id number]/22)








Past Medical-Social-Family Hx


Patient Social History


Tobacco Use?:  No


Smoking Status:  Former Smoker (10 year pack history )


Smokeless type used:  Chew


Smokeless Tobacco Frequency:  Current Everyday User


Use of E-Cig and/or Vaping dev:  No


Use of E-Cig and/or Vaping Haseeb:  Never a User


Substance use?:  No


Substance type:  Nicotine


Alcohol Use?:  No


Pt feels they are or have been:  No





Current Status


Advance Directives:  No


Communicates:  Verbally


Primary Language:  English


Preferred Spoken Language:  English


Is interpretation needed?:  No


Sensory deficits:  Vision impairment, Hearing impairment


Implanted or Applied Medical D:  Other





Past Medical History


Surgeries:  Gallbladder


Sleep Apnea, COPD


Currently Using CPAP:  Yes


Hypertension


Arthritis


Hypothyroidsim


Loss of Vision:  Denies


Hearing Impairment:  Hard of Hearing





Family Medical History


Cancer (bladder, prostate ), CVA





Review of Systems


Constitutional:  no symptoms reported


EENTM:  no symptoms reported


Respiratory:  cough, short of breath


Cardiovascular:  no symptoms reported


Gastrointestinal:  no symptoms reported





Physical Exam


Physical Exam


Vital Signs





Vital Signs - First Documented








 11/26/22 11/26/22





 12:50 19:39


 


Temp 36.2 


 


Pulse 86 


 


Resp 33 


 


B/P (MAP) 169/101 (123) 


 


Pulse Ox 91 


 


O2 Delivery Nasal Cannula 


 


O2 Flow Rate 5.00 


 


FiO2  40





Capillary Refill : Less Than 3 Seconds


Height, Weight, BMI


Height: '"


Weight: lbs. oz. kg; 33.65 BMI


Method:


General Appearance:  No Apparent Distress, Obese


HEENT:  PERRL/EOMI, Pharynx Normal


Neck:  Normal Inspection, Supple


Respiratory:  No Respiratory Distress, Decreased Breath Sounds


Cardiovascular:  Regular Rate, Rhythm, No Murmur


Gastrointestinal:  Normal Bowel Sounds, Non Tender, Soft


Extremity:  Normal Inspection, No Pedal Edema


Neurologic/Psychiatric:  Alert, Normal Mood/Affect


Skin:  Normal Color, Warm/Dry





Results


Results/Procedures


Labs


Laboratory Tests


11/26/22 13:30








11/27/22 04:03








Patient resulted labs reviewed.


Imaging:  Reviewed Imaging Films, Reviewed Imaging Report





Assessment/Plan


Admission Diagnosis


Acute respiratory failure with hypoxia


Admission Status:  Inpatient Order (span 2 midnights)


Reason for Inpatient Admission:  


Pleural effusion





Assessment and Plan


Acute respiratory failure with hypoxia


Pleural effusion


   Requiring supplemental oxygen, wean as able


   Imaging with pleural effusion


   Surgery consulted


   Planning for thoracentesis today





HTN


COPD


GREYSON


Hypothyroidism


Obesity


   Resume home meds once med rec completed





DVT prophylaxis: Lovenox





Diagnosis/Problems


Diagnosis/Problems





(1) Acute respiratory failure with hypoxia


Status:  Acute


(2) Pleural effusion, right


Status:  Acute


(3) COPD (chronic obstructive pulmonary disease)


Status:  Chronic


(4) HTN (hypertension)


Status:  Chronic


(5) Hypothyroidism


Status:  Chronic


(6) Obesity


Status:  Chronic


(7) GREYSON (obstructive sleep apnea)


Status:  Chronic











YONATAN WHALEY MD              Nov 27, 2022 18:50

## 2022-11-27 NOTE — CONSULTATION - SURGERY
MARTY MANZANO 22 1045:


History of Present Illness


History of Present Illness


Patient Consulted On(spencer/time)


22


 10:40


Date Seen by Provider:  2022


Time Seen by Provider:  09:20


History of Present Illness


Patient being seen in consult from ER for right Pleural Effusion and possible 

Thoracentesis. 





77 year old male from [place] with a Past MedHx of COPD, GREYSON, Hypothyroidism, 

Hypertension presented to [facility] ER with a chief complaint of worsening shortness 

of breath. Patient was visiting spouse parents in the area. States has 

progressively feeling worse for a while, but on Wednesday of this week things 

started to rapidly become worse. Patient attempted to use his albuterol 

nebulizer multiple times in attempt to help with SOB to no avail. Patient also 

endorses a productive cough and alternating diarrhea and constipation. Patient 

denies feeling any systemic symptoms (fever, chills, body aches, N/V). Patient 

states nothing really makes his SOB worse, it has just progressively been 

worsening. Patient has never been hospitalized for a COPD exacerbation or AHRF 

before.





Allergies and Home Medications


Allergies


Coded Allergies:  


     No Known Drug Allergies (Unverified , 22)





Patient Home Medication List


Home Medication List Reviewed:  Yes





Past Medical-Social-Family Hx


Patient Social History


Smoking Status:  Former Smoker (10 year pack history )


Alcohol Use?:  No


Substance type:  Nicotine


Have you traveled recently?:  Yes





Surgeries


History of Surgeries:  Yes


Surgeries:  Gallbladder





Respiratory


History of Respiratory Disorde:  Yes


Respiratory Disorders:  Sleep Apnea, COPD





Cardiovascular


History of Cardiac Disorders:  Yes


Cardiac Disorders:  Hypertension





Neurological


History of Neurological Disord:  No





Genitourinary


History of Genitourinary Disor:  No





Gastrointestinal


History of Gastrointestinal Di:  No





Musculoskeletal


History of Musculoskeletal Dis:  No





Endocrine


History of Endocrine Disorders:  Yes


Endocrine Disorders:  Hypothyroidsim





HEENT


History of HEENT Disorders:  No





Psychosocial


History of Psychiatric Problem:  No





Family Medical History


Significant Family History:  Cancer (bladder, prostate ), CVA





Review of Systems-General


Constitutional:  No chills, No diaphoresis


EENTM:  hearing loss (presbycusis ); No blurred vision, No double vision


Respiratory:  cough, dyspnea on exertion, short of breath


Cardiovascular:  No chest pain, No palpitations


Gastrointestinal:  No abdominal pain; constipation, diarrhea; No nausea, No 

vomiting


Genitourinary:  No dysuria, No frequency


Skin:  No change in color, No change in hair/nails


Psychiatric/Neurological:  Denies Anxiety, Denies Depressed





Physical Exam-General Problems


Physical Exam


Vital Signs





Vital Signs - First Documented








 22





 12:50 19:39


 


Temp 36.2 


 


Pulse 86 


 


Resp 33 


 


B/P (MAP) 169/101 (123) 


 


Pulse Ox 91 


 


O2 Delivery Nasal Cannula 


 


O2 Flow Rate 5.00 


 


FiO2  40





Capillary Refill : Less Than 3 Seconds


General Appearance:  WD/WN, mild distress (not tolerating BiPAP mask well )


HEENT:  PERRL/EOMI


Neck:  non-tender, supple


Respiratory:  no respiratory distress (however, not tolerating BiPAP well 

14/6|16|40%), no accessory muscle use, decreased breath sounds, crackles


Cardiovascular:  regular rate, rhythm (Auscultation very faint due to BiPAP 

interference ), no murmur


Gastrointestinal:  non tender, soft


Rectal:  deferred


Extremities:  non-tender, no pedal edema, no calf tenderness


Neurologic/Psychiatric:  alert, oriented x 3


Skin:  normal color, warm/dry





Data Review


Labs


Laboratory Tests


22 12:51: 


Influenza Type A (RT-PCR) Not Detected, Influenza Type B (RT-PCR) Not Detected, 

SARS-CoV-2 RNA (RT-PCR) Not Detected


22 13:20: 


Blood Gas Puncture Site RT RAD, Blood Gas Patient Temperature 37, Arterial Blood

pH 7.40, Arterial Blood Partial Pressure CO2 43, Arterial Blood Partial Pressure

O2 153H, Arterial Blood HCO3 26, Arterial Blood Total CO2 27.2, Arterial Blood 

Oxygen Saturation 100, Arterial Blood Base Excess 1.5, Corey Test YES-POS, Blood

Gas Ventilator Setting YES, Blood Gas Inspired Oxygen 60%


22 13:30: 


White Blood Count 10.7, Red Blood Count 5.53H, Hemoglobin 17.0, Hematocrit 51, 

Mean Corpuscular Volume 92, Mean Corpuscular Hemoglobin 31, Mean Corpuscular 

Hemoglobin Concent 34, Red Cell Distribution Width 13.2, Platelet Count 193, 

Mean Platelet Volume 10.8, Immature Granulocyte % (Auto) 1, Neutrophils (%) 

(Auto) 75, Lymphocytes (%) (Auto) 10L, Monocytes (%) (Auto) 9, Eosinophils (%) 

(Auto) 5, Basophils (%) (Auto) 1, Neutrophils # (Auto) 8.0H, Lymphocytes # 

(Auto) 1.0, Monocytes # (Auto) 1.0, Eosinophils # (Auto) 0.6H, Basophils # 

(Auto) 0.1, Immature Granulocyte # (Auto) 0.1, D-Dimer 3.19H, Sodium Level 131L,

Potassium Level 4.7, Chloride Level 99, Carbon Dioxide Level 19L, Anion Gap 13, 

Blood Urea Nitrogen 13, Creatinine 0.78, Estimat Glomerular Filtration Rate 93, 

BUN/Creatinine Ratio 17, Glucose Level 84, Calcium Level 9.3, Corrected Calcium 

9.9, Magnesium Level 1.9, Total Bilirubin 0.7, Aspartate Amino Transf (AST/SGOT)

41H, Alanine Aminotransferase (ALT/SGPT) 27, Alkaline Phosphatase 82, Troponin I

0.072H, C-Reactive Protein High Sensitivity 5.90H, B-Type Natriuretic Peptide 

87.7, Total Protein 7.8, Albumin 3.3, Procalcitonin 0.04


22 16:41: Troponin I 0.080H


22 18:40: 


Urine Color YELLOW, Urine Clarity CLEAR, Urine pH 6.0, Urine Specific Gravity 

1.010L, Urine Protein NEGATIVE, Urine Glucose (UA) NEGATIVE, Urine Ketones 1+H, 

Urine Nitrite NEGATIVE, Urine Bilirubin NEGATIVE, Urine Urobilinogen 0.2, Urine 

Leukocyte Esterase NEGATIVE, Urine RBC (Auto) 1+H, Urine RBC 2-5H, Urine WBC 

NONE, Urine Crystals NONE, Urine Bacteria NEGATIVE, Urine Casts NONE, Urine 

Mucus NEGATIVE, Urine Culture Indicated NO


22 19:41: Troponin I 0.060H


22 21:06: 


Blood Gas Puncture Site R RAD, Blood Gas Patient Temperature 36.5, Arterial 

Blood pH 7.39, Arterial Blood Partial Pressure CO2 44, Arterial Blood Partial 

Pressure O2 82, Arterial Blood HCO3 26, Arterial Blood Total CO2 27.8, Arterial 

Blood Oxygen Saturation 98, Arterial Blood Base Excess 1.9, Corey Test YES-POS, 

Blood Gas Ventilator Setting NO, Blood Gas Inspired Oxygen 40%


22 04:03: 


Troponin I 0.056H, White Blood Count 17.2H, Red Blood Count 5.72H, Hemoglobin 

17.4, Hematocrit 52, Mean Corpuscular Volume 91, Mean Corpuscular Hemoglobin 30,

Mean Corpuscular Hemoglobin Concent 33, Red Cell Distribution Width 13.0, 

Platelet Count 206, Mean Platelet Volume 11.4, Immature Granulocyte % (Auto) 1, 

Neutrophils (%) (Auto) 94H, Lymphocytes (%) (Auto) 3L, Monocytes (%) (Auto) 3, 

Eosinophils (%) (Auto) 0, Basophils (%) (Auto) 0, Neutrophils # (Auto) 16.1H, 

Lymphocytes # (Auto) 0.5L, Monocytes # (Auto) 0.5, Eosinophils # (Auto) 0.0, 

Basophils # (Auto) 0.0, Immature Granulocyte # (Auto) 0.1, Neutrophils % 

(Manual) 91, Lymphocytes % (Manual) 4, Monocytes % (Manual) 5, Blood Morphology 

Comment NORMAL, Sodium Level 132L, Potassium Level 4.4, Chloride Level 96L, 

Carbon Dioxide Level 20L, Anion Gap 16H, Blood Urea Nitrogen 19H, Creatinine 

0.96, Estimat Glomerular Filtration Rate 82, BUN/Creatinine Ratio 20, Glucose 

Level 120H, Calcium Level 9.4, Magnesium Level 2.1





Radiology


NAME:   CRISTINA LUNA


[facility] REC#:   [id number]


ACCOUNT#:   [id number]


PT STATUS:   REG ER


:   1945


PHYSICIAN:   JIAN SANTOS MD


ADMIT DATE:   22/ER


                                  ***Signed***


Date of Exam:22





CHEST 1 VIEW, AP/PA ONLY








HISTORY: Shortness of air





COMPARISON: None





TECHNIQUE: Frontal view of the chest.





FINDINGS:





There is elevation of the right hemidiaphragm with airspace


opacity in the right lung base. There may be a small right


pleural effusion. No pneumothorax is present. The cardiac


silhouette is normal in size. There is aortic atherosclerosis.





IMPRESSION:


1. Right basilar airspace opacity, may represent atelectasis or


infiltrate.


2. Elevation the right hemidiaphragm versus small pleural


effusion.





Dictated by: 





  Dictated on workstation # [id number]








Dict:   22


Trans:   22


[facility] [id number]





Interpreted by:     YOSHI HERNANDEZ MD


Electronically signed by: YOSHI HERNANDEZ MD 22 1409





                 [facility].


                                [place], [place]





NAME:   CRISTINA LUNA


[facility] REC#:   [id number]


ACCOUNT#:   [id number]


PT STATUS:   REG ER


:   1945


PHYSICIAN:   JIAN SANTOS MD


ADMIT DATE:   22/ER


                                  ***Signed***


Date of Exam:22





CT ANGIO CHEST W








EXAMINATION: CT angiography of the chest.





TECHNIQUE: Contrast enhanced thin section helical images were


obtained through the chest with intravenous contrast timed for


the optimal opacification of the arterial structures per CTA


protocol. Post-processing, reconstructions and interpretation of


angiographic images of the vessels was performed. 3D MIP


reconstructions were performed and reviewed. All CT scans use one


or more of the following dose optimizing techniques: automated


exposure control, MA and/or KvP adjustment based on patient size


and exam type or iterative reconstruction. 





HISTORY: Shortness of breath.





COMPARISON: None available.





FINDINGS: 





Vascular: No filling defect within the pulmonary arteries.


Thoracic aorta is normal in caliber. Calcification of the aorta


and coronary vessels.





Thyroid: The thyroid is normal.





Mediastinum: Heart size is normal without significant pericardial


effusion. No suspicious lymphadenopathy.





Lungs and airways: There is a large right pleural effusion with


adjacent atelectasis. No pneumothorax. Mild atelectasis within


the right lung base. The airways are normal.





Upper abdomen: The subphrenic structures are normal. 





Musculoskeletal: Degenerative changes of the spine without


suspicious osseous lesion or compression fracture.





IMPRESSION:


1. Large right pleural effusion with adjacent atelectasis.


2. No finding of pulmonary embolus.





Dictated by: 





  Dictated on workstation # [id number]








Dict:   22 1547


Trans:   22 1556


[facility] [phone]





Interpreted by:     CRISTINA CORCORAN DO


Electronically signed by: CRISTINA CORCORAN DO 22 1556





Assessment/Plan


AHRF


COPD Exacerbation


Right Pleural Effusion 


GREYSON


HTN


Hypothyroidism





Plan





Plan on US guided right thoracentesis as long as there is a large enough fluid 

pocket. Discussed risks and benefits with patient. Primary Risks including: 

Bleeding, Infection, Pneumothorax. They are agreeable to procedure, and 

understand that if we do not find a large enough pocket to drain we will not 

proceed. Patient was given Lovenox at 830PM last night.





MARBIN CARRIZALES DO 22 1320:


History of Present Illness


History of Present Illness


Time Seen by Provider:  11:21


History of Present Illness


Surgery asked to consult regarding Hypoxia and Pleural effusion.





HPI per ED:  Mr. Luna is a 77-year-old gentleman who presents to the emergency

room in respiratory distress.  On initial assessment he is moving air poorly, 

employing accessory muscle use, and has an oxygen saturation of 64%.  He was 

immediately placed on a high flow mask.  He is visiting the area for the holiday

and normally lives in the [facility].  He uses the [facility].  He 

describes history of COPD, obstructive sleep apnea, hypertension, and 

hypothyroidism.  He does not require oxygen use at home.  He has used DuoNeb in 

both the inhaled and nebulized form today.  He has not felt well for about the 

past 4 days.  He denies cough or fever.  He denies chest pain.





When I saw pt he stated he had never had anything like this before; he does have

lung problems.  He is not on home O2, but does use CPAP at night.  He was 

currently having some SOB and trouble with deep breathing; he was on an Oxy-mask

at 10L.





Allergies and Home Medications


Allergies


Coded Allergies:  


     No Known Drug Allergies (Unverified , 22)





Patient Home Medication List


Home Medication List Reviewed:  Yes





Past Medical-Social-Family Hx


Patient Social History


Smoking Status:  Former Smoker (10 year pack history )


Alcohol Use?:  No





Surgeries


History of Surgeries:  Yes


Surgeries:  Gallbladder





Respiratory


History of Respiratory Disorde:  Yes


Respiratory Disorders:  Sleep Apnea, COPD





Cardiovascular


History of Cardiac Disorders:  Yes


Cardiac Disorders:  Hypertension





Neurological


History of Neurological Disord:  No





Genitourinary


History of Genitourinary Disor:  No





Gastrointestinal


History of Gastrointestinal Di:  No





Musculoskeletal


History of Musculoskeletal Dis:  Yes


Musculoskeletal Disorders:  Arthritis





Endocrine


History of Endocrine Disorders:  Yes


Endocrine Disorders:  Hypothyroidsim





HEENT


History of HEENT Disorders:  No


Loss of Vision:  Denies


Hearing Impairment:  Hard of Hearing





Cancer


History of Cancer:  No





Psychosocial


History of Psychiatric Problem:  No





Integumentary


History of Skin or Integumenta:  No





Family Medical History


Significant Family History:  Cancer (bladder, prostate ), CVA





Review of Systems-General


Constitutional:  No chills, No diaphoresis


EENTM:  hearing loss (presbycusis ); No blurred vision, No double vision


Respiratory:  cough, dyspnea on exertion, short of breath


Cardiovascular:  No chest pain, No palpitations


Gastrointestinal:  No abdominal pain; constipation, diarrhea; No nausea, No 

vomiting


Genitourinary:  No dysuria, No frequency


Musculoskeletal:  back pain, joint pain, joint swelling, muscle stiffness


Skin:  No change in color, No change in hair/nails


Psychiatric/Neurological:  Denies Anxiety, Denies Depressed, Denies Seizure, 

Denies Tremors





Physical Exam-General Problems


Physical Exam


General Appearance:  WD/WN, mild distress (not tolerating BiPAP mask well )


Eyes:  Bilateral Eye PERRL, Bilateral Eye EOMI


HEENT:  pharynx normal; No scleral icterus (R), No scleral icterus (L)


Neck:  non-tender, supple


Respiratory:  respiratory distress (appears to be "heaving" to take breaths), 

decreased breath sounds, accessory muscle use, crackles


Cardiovascular:  regular rate, rhythm (Auscultation very faint due to BiPAP 

interference ), no murmur


Gastrointestinal:  non tender, soft, no organomegaly


Rectal:  deferred


Back:  no CVA tenderness, no vertebral tenderness


Extremities:  non-tender, no pedal edema, no calf tenderness


Neurologic/Psychiatric:  alert, oriented x 3


Skin:  normal color, warm/dry


Lymphatic:  no adenopathy (neck, axilla or groin)





Assessment/Plan


Acute Hypoxic Respiratory Failure


COPD Exacerbation


Right Pleural Effusion 


GREYSON


HTN


Hypothyroidism





Plan





Plan on US guided right thoracentesis as long as there is a large enough fluid 

pocket. Discussed risks and benefits with patient. Primary Risks including: 

Bleeding, Infection, Pneumothorax. They are agreeable to procedure, and 

understand that if we do not find a large enough pocket to drain we will not 

proceed. Patient was given Lovenox at 830PM last night.  I reviewed the CT and 

Xray myself and discussed the case with Dr. Prince.  I also attempted to US 

myself and could not get a good picture of the fluid pocket.  Had US tech come 

in and able to get good image and plan for the Thoracentesis.  All questions 

answered to his satisfaction.  We did talk about increased bleeding (because of 

the Lovenox), the risk of Pneumothorax and also the fact that draining the fluid

may not change anything.  He wants to try it.





Supervisory-Addendum Brief


Verification & Attestation


Participated in pt care:  history, MDM, physical


Personally performed:  exam, history, MDM, supervision of care


Care discussed with:  Medical Student


Procedures:  n/a


Verification and Attestation of Medical Student E/M Service





A medical student performed and documented this service. I then reviewed and 

verified all information documented by the medical student and made 

modifications to such information, when appropriate. I personally performed a 

physical exam, medical decision making and then discussed any differences 

between the notes and made revisions as necessary to create one note.





Marbin Carrizales , 22 , 13:24











MARTY MANZANO                 2022 10:45


MARBIN CARRIZALES DO               2022 13:20

## 2022-11-27 NOTE — DIAGNOSTIC IMAGING REPORT
EXAM: Ultrasound guidance for thoracentesis.



EXAM DATE: 11/27/2022



COMPARISON: None



HISTORY: Pleural effusion.



TECHNIQUE: Ultrasound guidance for thoracentesis.



FINDINGS: Ultrasound guidance is provided for thoracentesis.

Pocket of fluid is seen within the right pleural cavity.



IMPRESSION: Ultrasound guidance for thoracentesis. Please see

operative report for more details.



Dictated by: 



  Dictated on workstation # [id number]

## 2022-11-27 NOTE — PROGRESS NOTE-POST OPERATIVE
Post-Operative Progess Note


Surgeon (s)/Assistant (s)


Surgeon


MARBIN CARRIZALES DO


Assistant:  MARY Perez





Pre-Operative Diagnosis


Right pleural effusion





Post-Operative Diagnosis





same pending path





Procedure & Operative Findings


Date of Procedure


11/27/22


Procedure Performed/Findings


Right Thoracentesis





A time out was performed and the chest x-ray was reviewed, the  appropriate side

was confirmed and marked.  The patient was prepped and draped in a sterile 

manner using  chlorhexidine scrub after the appropriate level was percussed and 

confirmed by ultrasound. 1% lidocaine was used to anesthesize the skin,  

subcutaneous tissue, superior aspect of the rib periosteum and parietal  pleura.

. A #11 blade scalpel was used to nick the  skin at the insertion site; which 

was around 8 or 9th rib. The Safe-t-Centesis needle was then  introduced through

the skin incision into the pleural space using  negative aspiration pressure and

got an immediate return of a darker fluid. The thoracentesis catheter was  then 

threaded without difficulty. Approximately 2100 ml of dark straw colored fluid 

was removed  without difficulty. The catheter was then removed. No immediate  

complications were noted during the procedure. A post-procedure chest  x-ray is 

pending at the time of this note. The fluid will be sent for  studies.  

Estimated blood loss is scant.


Anesthesia Type


Local lidocaine





Estimated Blood Loss


Estimated blood loss (mL):  scant





Specimens/Packing


Specimens Removed


appx 2100ml of fluid











MARBIN CARRIZALES DO               Nov 27, 2022 13:29

## 2022-11-28 VITALS — SYSTOLIC BLOOD PRESSURE: 100 MMHG | DIASTOLIC BLOOD PRESSURE: 62 MMHG

## 2022-11-28 VITALS — SYSTOLIC BLOOD PRESSURE: 101 MMHG | DIASTOLIC BLOOD PRESSURE: 75 MMHG

## 2022-11-28 VITALS — SYSTOLIC BLOOD PRESSURE: 98 MMHG | DIASTOLIC BLOOD PRESSURE: 62 MMHG

## 2022-11-28 LAB
BASOPHILS # BLD AUTO: 0 10^3/UL (ref 0–0.1)
BASOPHILS NFR BLD AUTO: 0 % (ref 0–10)
BUN/CREAT SERPL: 29
CALCIUM SERPL-MCNC: 8.6 MG/DL (ref 8.5–10.1)
CHLORIDE SERPL-SCNC: 99 MMOL/L (ref 98–107)
CO2 SERPL-SCNC: 23 MMOL/L (ref 21–32)
CREAT SERPL-MCNC: 0.84 MG/DL (ref 0.6–1.3)
EOSINOPHIL # BLD AUTO: 0 10^3/UL (ref 0–0.3)
EOSINOPHIL NFR BLD AUTO: 0 % (ref 0–10)
GFR SERPLBLD BASED ON 1.73 SQ M-ARVRAT: 91 ML/MIN
GLUCOSE SERPL-MCNC: 102 MG/DL (ref 70–105)
HCT VFR BLD CALC: 47 % (ref 40–54)
HGB BLD-MCNC: 15.3 G/DL (ref 13.3–17.7)
LYMPHOCYTES # BLD AUTO: 1.2 10^3/UL (ref 1–4)
LYMPHOCYTES NFR BLD AUTO: 9 % (ref 12–44)
MAGNESIUM SERPL-MCNC: 2 MG/DL (ref 1.6–2.4)
MANUAL DIFFERENTIAL PERFORMED BLD QL: NO
MCH RBC QN AUTO: 30 PG (ref 25–34)
MCHC RBC AUTO-ENTMCNC: 33 G/DL (ref 32–36)
MCV RBC AUTO: 91 FL (ref 80–99)
MONOCYTES # BLD AUTO: 1.2 10^3/UL (ref 0–1)
MONOCYTES NFR BLD AUTO: 9 % (ref 0–12)
NEUTROPHILS # BLD AUTO: 10.7 10^3/UL (ref 1.8–7.8)
NEUTROPHILS NFR BLD AUTO: 81 % (ref 42–75)
PLATELET # BLD: 193 10^3/UL (ref 130–400)
PMV BLD AUTO: 11.1 FL (ref 9–12.2)
POTASSIUM SERPL-SCNC: 3.6 MMOL/L (ref 3.6–5)
SODIUM SERPL-SCNC: 134 MMOL/L (ref 135–145)
WBC # BLD AUTO: 13.2 10^3/UL (ref 4.3–11)

## 2022-11-28 RX ADMIN — POTASSIUM CHLORIDE SCH MEQ: 1500 TABLET, EXTENDED RELEASE ORAL at 05:45

## 2022-11-28 RX ADMIN — ALBUTEROL SULFATE SCH MG: 2.5 SOLUTION RESPIRATORY (INHALATION) at 14:44

## 2022-11-28 RX ADMIN — ALBUTEROL SULFATE SCH MG: 2.5 SOLUTION RESPIRATORY (INHALATION) at 10:45

## 2022-11-28 RX ADMIN — POTASSIUM CHLORIDE SCH MLS/HR: 200 INJECTION, SOLUTION INTRAVENOUS at 05:45

## 2022-11-28 RX ADMIN — ALBUTEROL SULFATE SCH MG: 2.5 SOLUTION RESPIRATORY (INHALATION) at 07:46

## 2022-11-28 RX ADMIN — LEVOTHYROXINE SODIUM SCH MCG: 25 TABLET ORAL at 09:55

## 2022-11-28 RX ADMIN — ALBUTEROL SULFATE SCH MG: 2.5 SOLUTION RESPIRATORY (INHALATION) at 18:59

## 2022-11-28 RX ADMIN — DOCUSATE SODIUM SCH MG: 100 CAPSULE ORAL at 09:02

## 2022-11-28 RX ADMIN — ENOXAPARIN SODIUM SCH MG: 100 INJECTION SUBCUTANEOUS at 20:10

## 2022-11-28 RX ADMIN — CARVEDILOL SCH MG: 25 TABLET, FILM COATED ORAL at 09:54

## 2022-11-28 RX ADMIN — ALBUTEROL SULFATE SCH MG: 2.5 SOLUTION RESPIRATORY (INHALATION) at 22:37

## 2022-11-28 RX ADMIN — DOCUSATE SODIUM SCH MG: 100 CAPSULE ORAL at 20:10

## 2022-11-28 RX ADMIN — MAGNESIUM SULFATE IN DEXTROSE SCH MLS/HR: 10 INJECTION, SOLUTION INTRAVENOUS at 05:45

## 2022-11-28 RX ADMIN — ALBUTEROL SULFATE SCH MG: 2.5 SOLUTION RESPIRATORY (INHALATION) at 03:02

## 2022-11-28 RX ADMIN — DOCUSATE SODIUM SCH MG: 100 CAPSULE ORAL at 09:48

## 2022-11-28 RX ADMIN — SENNOSIDES SCH MG: 8.6 TABLET, FILM COATED ORAL at 20:10

## 2022-11-28 RX ADMIN — SENNOSIDES SCH MG: 8.6 TABLET, FILM COATED ORAL at 09:48

## 2022-11-28 RX ADMIN — SENNOSIDES SCH MG: 8.6 TABLET, FILM COATED ORAL at 09:03

## 2022-11-28 NOTE — PROGRESS NOTE - SURGERY
Subjective


Time Seen by a Provider:  10:01


Subjective/Events-last exam


Pt seen and examined, state he is breathing much better and no chest pain.  

Nurse states he is on much less O2 than before.


Review of Systems


General:  No Chills, No Night Sweats


Pulmonary:  Dyspnea; No Cough, No Pleuritic Chest Pain


Cardiovascular:  No: Chest Pain, Palpitations


Gastrointestinal:  No: Nausea, Vomiting, Abdominal Pain





Objective


Exam





Vital Signs








  Date Time  Temp Pulse Resp B/P (MAP) Pulse Ox O2 Delivery O2 Flow Rate FiO2


 


11/28/22 10:50     96 OxyMask 2.00 


 


11/28/22 10:45     97 OxyMask 4.00 


 


11/28/22 10:00  87 33 113/75 (88) 92 OxyMask 4.00 


 


11/28/22 09:00  87 29 109/74 (86) 92 Nasal Cannula 4.00 


 


11/28/22 08:42 36.5     Nasal Cannula 4.00 


 


11/28/22 08:00     90 OxyMask 2.00 


 


11/28/22 08:00  73 21 112/62 (79) 91 OxyMask 2.00 


 


11/28/22 07:46     92 OxyMask 2.00 


 


11/28/22 07:00  64  109/74 (86) 93 OxyMask 2.00 


 


11/28/22 06:54  61      


 


11/28/22 06:00  65  105/73 (84) 95 OxyMask 2.00 


 


11/28/22 05:00  69 19 99/69 (79) 94 OxyMask 2.00 


 


11/28/22 04:00  69 12 113/72 (86) 92 OxyMask 2.00 


 


11/28/22 03:57     94 NIV Bilevel 2.00 40


 


11/28/22 03:02  73 24  94  40.00 


 


11/28/22 03:00  65 8 101/75 (84) 94 OxyMask 2.00 


 


11/28/22 02:00  73  93/59 (70) 96 OxyMask 2.00 


 


11/28/22 01:00  71      


 


11/28/22 01:00  71 25 109/78 (88) 94 OxyMask 2.00 


 


11/28/22 00:00  77 20 121/72 (88) 95 OxyMask 2.00 


 


11/27/22 23:59     94 NIV Bilevel 2.00 40


 


11/27/22 23:00  78 17 101/75 (84) 95 OxyMask 2.00 


 


11/27/22 22:26  80 17  95  40.00 


 


11/27/22 22:02  103  116/70 (85) 95 OxyMask 2.00 


 


11/27/22 21:00  84  99/64 (76) 93 OxyMask 2.00 


 


11/27/22 20:00 36.1       


 


11/27/22 20:00  90  111/66 (81) 91 OxyMask 2.00 


 


11/27/22 20:00     95 OxyMask 2.00 


 


11/27/22 19:30  90  109/66 (80) 92 OxyMask 2.00 


 


11/27/22 19:00  91      


 


11/27/22 19:00  91  87/60 (69) 93 OxyMask 2.00 


 


11/27/22 18:59     92 OxyMask 2.00 


 


11/27/22 18:00  87  111/75 (87) 94 High Flow N/C 5.00 


 


11/27/22 17:00  93  117/68 (84) 93 High Flow N/C 5.00 


 


11/27/22 16:00 35.9       


 


11/27/22 16:00  95  105/72 (83) 93 High Flow N/C 5.00 


 


11/27/22 16:00     94 OxyMask 4.50 


 


11/27/22 15:01     94 OxyMask 5.00 


 


11/27/22 15:00  89  113/74 (87) 92 High Flow N/C 5.00 


 


11/27/22 14:24      High Flow N/C 5.00 


 


11/27/22 14:00  86  126/72 (90) 92 OxyMask 10.00 


 


11/27/22 13:00  85      


 


11/27/22 13:00  80 23 103/76 (85) 91 OxyMask 10.00 


 


11/27/22 12:00  79 19 112/72 (85) 94 OxyMask 10.00 


 


11/27/22 12:00     95 OxyMask 10.00 


 


11/27/22 11:55 36.3       














I & O 


 


 11/28/22





 07:00


 


Intake Total 800 ml


 


Output Total 575 ml


 


Balance 225 ml





Capillary Refill : Less Than 3 Seconds


General Appearance:  No Apparent Distress (still breathing a little hard 

though), WD/WN


HEENT:  PERRL/EOMI


Respiratory:  No Accessory Muscle Use, No Respiratory Distress, Crackles, 

Decreased Breath Sounds (diminished throughout)


Cardiovascular:  Regular Rate, Rhythm, No Murmur


Gastrointestinal:  non tender, soft, no organomegaly


Extremity:  No Pedal Edema


Neurologic/Psychiatric:  Alert, Oriented x3





Results


Lab


Laboratory Tests


11/27/22 13:00: 


Body Fluid Source PLEURAL, Body Fluid Color YELLOW, Body Fluid Appearance MOD 

CLDY, Body Fluid WBC 4.657, Body Fluid RBC 0.012, Body Fl Polynuclear WBCs 

(%)(Auto) 77.9, Body Fluid Mononuclear Cells % Auto 22.1, Body Fluid Slide 

Review Yes, Body Fluid Glucose 127, Body Fluid Total Protein 4.9


11/28/22 04:30: 


White Blood Count 13.2H, Red Blood Count 5.11, Hemoglobin 15.3, Hematocrit 47, 

Mean Corpuscular Volume 91, Mean Corpuscular Hemoglobin 30, Mean Corpuscular 

Hemoglobin Concent 33, Red Cell Distribution Width 13.1, Platelet Count 193, 

Mean Platelet Volume 11.1, Immature Granulocyte % (Auto) 0, Neutrophils (%) 

(Auto) 81H, Lymphocytes (%) (Auto) 9L, Monocytes (%) (Auto) 9, Eosinophils (%) 

(Auto) 0, Basophils (%) (Auto) 0, Neutrophils # (Auto) 10.7H, Lymphocytes # 

(Auto) 1.2, Monocytes # (Auto) 1.2H, Eosinophils # (Auto) 0.0, Basophils # 

(Auto) 0.0, Immature Granulocyte # (Auto) 0.1, Sodium Level 134L, Potassium 

Level 3.6, Chloride Level 99, Carbon Dioxide Level 23, Anion Gap 12, Blood Urea 

Nitrogen 24H, Creatinine 0.84, Estimat Glomerular Filtration Rate 91, 

BUN/Creatinine Ratio 29, Glucose Level 102, Calcium Level 8.6, Magnesium Level 

2.0





Microbiology


11/27/22 Gram Stain - Final, Resulted


           


11/27/22 Body Fluid Culture - Preliminary, Resulted


           


11/26/22 MRSA Screen - Final, Complete


           MRSA not isolated





Assessment/Plan


Acute Hypoxic Respiratory Failure - improved, using less O2


COPD Exacerbation


Right Pleural Effusion - repeat CXR showed improvement and no pnuemothorax


GREYSON


HTN


Hypothyroidism





Plan





Pt told to work with RT for breathing and treatments as needed.  No pleural 

effusion.  Will sign off and reconsult if needed.











MARBIN CARRIZALES DO               Nov 28, 2022 11:32

## 2022-11-28 NOTE — TELE-ICU PROGRESS NOTE
Subjective


Date Seen by a Provider:  Nov 28, 2022


Time Seen by a Provider:  09:22


Subjective/Events-last exam


(Tele-ICU Physician , Progress Note )


Service provided via interactive audio and video telecommunications  E-CARE 

system to a patient admitted to ICU bed in [facility].





Available chart/ vitals / labs / Images reviewed


Video assessment done using   teleICU camera, rest of exam as per RN


Discussed with RN


Events overnight : 





Afebrile


hemodynamically stable


Respiratory -  NC 4L


I/O = neg 





Drips: 


Pressors- no











A/P


Acute resp failure ) no pe , large effusion on right  with atelectasis vs 

infiltrate - small


- on BIPAP 14/6 40 % , rr 32  - AAO  - NOW ON 5L 


-s/p RIGHT thora 11/27 -2100 removed 


- cxr after thora clear


- attempt to wean 








Right basilar airspace opacity/  Large right pleural effusion


- etiology is not clear 


-s/p RIGHT thora 11/27 -2100 removed , await w/up 


- cxr after thora clear 





COPD ( no sign emphysema on CT 


- ? exacerbation 


 - as per bedside 


- received one dose of steroids in ER 








GREYSON


- reported 


 - on CPAP at home - TO CONT nightly 





Leukocytes


 - ? steroids related bs  infection  - off abx now 


- neg flu  and covid 














Lines :      , (Central Line Necessity Reviewed)


Larose: +


OG:


Nutrition: po 


Analgesia:


Anxiety/ delirium





VTE Prophylaxis: lov 40 


Stress Ulcer Prophylaxis: na








Plans in collaboration with   bedside consultants and IM MDs.


Discussed with RN to reach out if any questions or concerns








A total of 22   minutes of critical care time was devoted to this patient today,

required to treat and/or prevent further deterioration of critical care 

condition ( as above ) .











I am remotely monitoring this patient from another state.  I am unable to do the

bedside exam, and history/physical and pertinent information is taken from other

notes in the computer and bedside staff. 


+





Sepsis Event


Evaluation


Height, Weight, BMI


Height: '"


Weight: lbs. oz. kg; 33.65 BMI


Method:





Exam


Exam


Patient acknowledged, consented, and participated in this virtual visit which 

was conducted using real time audio/video


Vital Signs








  Date Time  Temp Pulse Resp B/P (MAP) Pulse Ox O2 Delivery O2 Flow Rate FiO2


 


11/28/22 08:42 36.5     Nasal Cannula 4.00 


 


11/28/22 08:00     90 OxyMask 2.00 


 


11/28/22 07:46     92 OxyMask 2.00 


 


11/28/22 06:54  61      


 


11/28/22 06:00  65  105/73 (84) 95 OxyMask 2.00 


 


11/28/22 05:00  69 19 99/69 (79) 94 OxyMask 2.00 


 


11/28/22 04:00  69 12 113/72 (86) 92 OxyMask 2.00 


 


11/28/22 03:57     94 NIV Bilevel 2.00 40


 


11/28/22 03:02  73 24  94  40.00 


 


11/28/22 03:00  65 8 101/75 (84) 94 OxyMask 2.00 


 


11/28/22 02:00  73  93/59 (70) 96 OxyMask 2.00 


 


11/28/22 01:00  71      


 


11/28/22 01:00  71 25 109/78 (88) 94 OxyMask 2.00 


 


11/28/22 00:00  77 20 121/72 (88) 95 OxyMask 2.00 


 


11/27/22 23:59     94 NIV Bilevel 2.00 40


 


11/27/22 23:00  78 17 101/75 (84) 95 OxyMask 2.00 


 


11/27/22 22:26  80 17  95  40.00 


 


11/27/22 22:02  103  116/70 (85) 95 OxyMask 2.00 


 


11/27/22 21:00  84  99/64 (76) 93 OxyMask 2.00 


 


11/27/22 20:00 36.1       


 


11/27/22 20:00  90  111/66 (81) 91 OxyMask 2.00 


 


11/27/22 20:00     95 OxyMask 2.00 


 


11/27/22 19:30  90  109/66 (80) 92 OxyMask 2.00 


 


11/27/22 19:00  91      


 


11/27/22 19:00  91  87/60 (69) 93 OxyMask 2.00 


 


11/27/22 18:59     92 OxyMask 2.00 


 


11/27/22 18:00  87  111/75 (87) 94 High Flow N/C 5.00 


 


11/27/22 17:00  93  117/68 (84) 93 High Flow N/C 5.00 


 


11/27/22 16:00 35.9       


 


11/27/22 16:00  95  105/72 (83) 93 High Flow N/C 5.00 


 


11/27/22 16:00     94 OxyMask 4.50 


 


11/27/22 15:01     94 OxyMask 5.00 


 


11/27/22 15:00  89  113/74 (87) 92 High Flow N/C 5.00 


 


11/27/22 14:24      High Flow N/C 5.00 


 


11/27/22 14:00  86  126/72 (90) 92 OxyMask 10.00 


 


11/27/22 13:00  85      


 


11/27/22 13:00  80 23 103/76 (85) 91 OxyMask 10.00 


 


11/27/22 12:00  79 19 112/72 (85) 94 OxyMask 10.00 


 


11/27/22 12:00     95 OxyMask 10.00 


 


11/27/22 11:55 36.3       


 


11/27/22 11:00  76 24 123/80 (94) 95 OxyMask 10.00 


 


11/27/22 10:58     95 OxyMask 10.00 


 


11/27/22 10:45      OxyMask 10.00 


 


11/27/22 10:00  81 20 130/75 (93) 95 High Flow N/C 6.00 














I & O 


 


 11/28/22





 07:00


 


Intake Total 800 ml


 


Output Total 575 ml


 


Balance 225 ml








Height & Weight


Height: '"


Weight: lbs. oz. kg; 33.65 BMI


Method:


General Appearance:  No Apparent Distress, WD/WN


HEENT:  PERRL/EOMI, Pharynx Normal


Neck:  Normal Inspection, Supple


Respiratory:  Lungs Clear, No Accessory Muscle Use, No Respiratory Distress, 

Other (on oxi-mask)


Cardiovascular:  Regular Rate, Rhythm, No Murmur


Capillary Refill:  Less Than 3 Seconds


Gastrointestinal:  non tender, soft, no organomegaly


Extremity:  Normal Inspection, No Pedal Edema


Neurologic/Psychiatric:  Alert, Oriented x3


Skin:  Normal Color, Warm/Dry





Results


Lab


Laboratory Tests


11/26/22 13:30








11/27/22 04:03








11/28/22 04:30











Assessment/Plan


Assessment/Plan


1











KERRI MOSES MD         Nov 28, 2022 09:24

## 2022-11-28 NOTE — PROGRESS NOTE - HOSPITALIST
Subjective


HPI/CC On Admission


Date Seen by Provider:  Nov 28, 2022


Carlos Luna is a 77 year old male with PMH HTN, COPD, GREYSON, hypothyroidism, 

obesity, who presented with shortness of breath. He does not use oxygen at 

baseline. He has been having worsening breathing trouble for several days. He 

has had a cough. He denies fevers and chills. He denies chest pain. He denies 

abdominal pain, nausea, and vomiting. He has never had a pleural effusion. [facility] 

[facility]


Subjective/Events-last exam


Pt reports feeling much better today. Breathing easier. Discussed with patient 

and his wife on the phone.





Objective


Exam


Vital Signs





Vital Signs








  Date Time  Temp Pulse Resp B/P (MAP) Pulse Ox O2 Delivery O2 Flow Rate FiO2


 


11/28/22 08:42 36.5     Nasal Cannula 4.00 


 


11/28/22 08:00     90   


 


11/28/22 06:54  61      


 


11/28/22 06:00    105/73 (84)    


 


11/28/22 05:00   19     


 


11/28/22 03:57        40





Capillary Refill : Less Than 3 Seconds


General Appearance:  No Apparent Distress, WD/WN


Respiratory:  Lungs Clear, No Accessory Muscle Use, No Respiratory Distress, 

Other (on oxi-mask)


Cardiovascular:  Regular Rate, Rhythm, No Murmur


Neurologic/Psychiatric:  Alert, Oriented x3





Results/Procedures


Lab


Laboratory Tests


11/28/22 04:30








Patient resulted labs reviewed.


Imaging:  Reviewed Imaging Films, Reviewed Imaging Report





Assessment/Plan


Assessment and Plan


Assess & Plan/Chief Complaint


Acute respiratory failure with hypoxia


Pleural effusion


   Requiring supplemental oxygen, wean as able


   Imaging with pleural effusion


   Surgery consulted, appreciate recs


   s/p thoracentesis with 2.1L drained


   Await cultures and path





HTN


COPD


GREYSON


Hypothyroidism


Obesity


   Resume home meds once med rec completed





DVT prophylaxis: DEWAYNE Gregg MD              Nov 28, 2022 09:05

## 2022-11-29 VITALS — SYSTOLIC BLOOD PRESSURE: 92 MMHG | DIASTOLIC BLOOD PRESSURE: 60 MMHG

## 2022-11-29 VITALS — DIASTOLIC BLOOD PRESSURE: 60 MMHG | SYSTOLIC BLOOD PRESSURE: 95 MMHG

## 2022-11-29 VITALS — SYSTOLIC BLOOD PRESSURE: 98 MMHG | DIASTOLIC BLOOD PRESSURE: 64 MMHG

## 2022-11-29 VITALS — DIASTOLIC BLOOD PRESSURE: 59 MMHG | SYSTOLIC BLOOD PRESSURE: 90 MMHG

## 2022-11-29 LAB
BASOPHILS # BLD AUTO: 0.1 10^3/UL (ref 0–0.1)
BASOPHILS NFR BLD AUTO: 1 % (ref 0–10)
BUN/CREAT SERPL: 26
CALCIUM SERPL-MCNC: 8.5 MG/DL (ref 8.5–10.1)
CHLORIDE SERPL-SCNC: 99 MMOL/L (ref 98–107)
CO2 SERPL-SCNC: 26 MMOL/L (ref 21–32)
CREAT SERPL-MCNC: 0.81 MG/DL (ref 0.6–1.3)
EOSINOPHIL # BLD AUTO: 0.3 10^3/UL (ref 0–0.3)
EOSINOPHIL NFR BLD AUTO: 4 % (ref 0–10)
GFR SERPLBLD BASED ON 1.73 SQ M-ARVRAT: 92 ML/MIN
GLUCOSE SERPL-MCNC: 83 MG/DL (ref 70–105)
HCT VFR BLD CALC: 47 % (ref 40–54)
HGB BLD-MCNC: 15.3 G/DL (ref 13.3–17.7)
LYMPHOCYTES # BLD AUTO: 1.6 10^3/UL (ref 1–4)
LYMPHOCYTES NFR BLD AUTO: 22 % (ref 12–44)
MAGNESIUM SERPL-MCNC: 2.1 MG/DL (ref 1.6–2.4)
MANUAL DIFFERENTIAL PERFORMED BLD QL: NO
MCH RBC QN AUTO: 30 PG (ref 25–34)
MCHC RBC AUTO-ENTMCNC: 32 G/DL (ref 32–36)
MCV RBC AUTO: 92 FL (ref 80–99)
MONOCYTES # BLD AUTO: 0.8 10^3/UL (ref 0–1)
MONOCYTES NFR BLD AUTO: 11 % (ref 0–12)
NEUTROPHILS # BLD AUTO: 4.4 10^3/UL (ref 1.8–7.8)
NEUTROPHILS NFR BLD AUTO: 61 % (ref 42–75)
PLATELET # BLD: 169 10^3/UL (ref 130–400)
PMV BLD AUTO: 11 FL (ref 9–12.2)
POTASSIUM SERPL-SCNC: 3.5 MMOL/L (ref 3.6–5)
SODIUM SERPL-SCNC: 137 MMOL/L (ref 135–145)
WBC # BLD AUTO: 7.1 10^3/UL (ref 4.3–11)

## 2022-11-29 RX ADMIN — POTASSIUM CHLORIDE SCH MEQ: 1500 TABLET, EXTENDED RELEASE ORAL at 05:51

## 2022-11-29 RX ADMIN — CARVEDILOL SCH MG: 25 TABLET, FILM COATED ORAL at 08:23

## 2022-11-29 RX ADMIN — MAGNESIUM SULFATE IN DEXTROSE SCH MLS/HR: 10 INJECTION, SOLUTION INTRAVENOUS at 05:43

## 2022-11-29 RX ADMIN — ENOXAPARIN SODIUM SCH MG: 100 INJECTION SUBCUTANEOUS at 21:03

## 2022-11-29 RX ADMIN — DOCUSATE SODIUM SCH MG: 100 CAPSULE ORAL at 21:03

## 2022-11-29 RX ADMIN — LEVOTHYROXINE SODIUM SCH MCG: 25 TABLET ORAL at 08:22

## 2022-11-29 RX ADMIN — SENNOSIDES SCH MG: 8.6 TABLET, FILM COATED ORAL at 21:03

## 2022-11-29 RX ADMIN — ALBUTEROL SULFATE SCH MG: 2.5 SOLUTION RESPIRATORY (INHALATION) at 14:28

## 2022-11-29 RX ADMIN — ALBUTEROL SULFATE SCH MG: 2.5 SOLUTION RESPIRATORY (INHALATION) at 07:32

## 2022-11-29 RX ADMIN — SENNOSIDES SCH MG: 8.6 TABLET, FILM COATED ORAL at 08:22

## 2022-11-29 RX ADMIN — DOCUSATE SODIUM SCH MG: 100 CAPSULE ORAL at 08:23

## 2022-11-29 RX ADMIN — ALBUTEROL SULFATE SCH MG: 2.5 SOLUTION RESPIRATORY (INHALATION) at 18:08

## 2022-11-29 RX ADMIN — POTASSIUM CHLORIDE SCH MLS/HR: 200 INJECTION, SOLUTION INTRAVENOUS at 05:51

## 2022-11-29 RX ADMIN — ALBUTEROL SULFATE SCH MG: 2.5 SOLUTION RESPIRATORY (INHALATION) at 10:49

## 2022-11-29 RX ADMIN — ALBUTEROL SULFATE SCH MG: 2.5 SOLUTION RESPIRATORY (INHALATION) at 21:30

## 2022-11-29 RX ADMIN — ALBUTEROL SULFATE SCH MG: 2.5 SOLUTION RESPIRATORY (INHALATION) at 02:47

## 2022-11-29 NOTE — PROGRESS NOTE - HOSPITALIST
Subjective


HPI/CC On Admission


Date Seen by Provider:  Nov 29, 2022


Carlos Luna is a 77 year old male with PMH HTN, COPD, GREYSON, hypothyroidism, 

obesity, who presented with shortness of breath. He does not use oxygen at 

baseline. He has been having worsening breathing trouble for several days. He 

has had a cough. He denies fevers and chills. He denies chest pain. He denies 

abdominal pain, nausea, and vomiting. He has never had a pleural effusion. [facility] 

[facility]


Subjective/Events-last exam


Pt reports doing much better. Breathing well. No new complaints. Hopeful for DC 

home soon.





Objective


Exam


Vital Signs





Vital Signs








  Date Time  Temp Pulse Resp B/P (MAP) Pulse Ox O2 Delivery O2 Flow Rate FiO2


 


11/29/22 11:00  75  98/62 (74) 93 NIV Bilevel 40.00 


 


11/29/22 07:51 36.0       


 


11/29/22 04:00        40


 


11/29/22 02:47   16     





Capillary Refill : Less Than 3 Seconds


General Appearance:  No Apparent Distress, WD/WN


Respiratory:  Lungs Clear, No Respiratory Distress


Cardiovascular:  Regular Rate, Rhythm, No Murmur


Neurologic/Psychiatric:  Alert, Oriented x3





Results/Procedures


Lab


Laboratory Tests


11/29/22 04:27








Patient resulted labs reviewed.


Imaging:  Reviewed Imaging Films, Reviewed Imaging Report





Assessment/Plan


Assessment and Plan


Assess & Plan/Chief Complaint


Acute respiratory failure with hypoxia


Pleural effusion


   Requiring supplemental oxygen, wean as able


   Imaging with pleural effusion


   Surgery consulted, appreciate recs


   s/p thoracentesis with 2.1L drained


   Negative cultures and path pending


   Transfer to [facility]





HTN


COPD


GREYSON


Hypothyroidism


Obesity


   Continue home meds as appropriate





DVT prophylaxis: DEWAYNE Gregg MD              Nov 29, 2022 12:00

## 2022-11-30 VITALS — SYSTOLIC BLOOD PRESSURE: 103 MMHG | DIASTOLIC BLOOD PRESSURE: 67 MMHG

## 2022-11-30 VITALS — DIASTOLIC BLOOD PRESSURE: 61 MMHG | SYSTOLIC BLOOD PRESSURE: 106 MMHG

## 2022-11-30 VITALS — DIASTOLIC BLOOD PRESSURE: 64 MMHG | SYSTOLIC BLOOD PRESSURE: 96 MMHG

## 2022-11-30 VITALS — SYSTOLIC BLOOD PRESSURE: 106 MMHG | DIASTOLIC BLOOD PRESSURE: 61 MMHG

## 2022-11-30 LAB
BASOPHILS # BLD AUTO: 0.1 10^3/UL (ref 0–0.1)
BASOPHILS NFR BLD AUTO: 1 % (ref 0–10)
BUN/CREAT SERPL: 19
CALCIUM SERPL-MCNC: 8.7 MG/DL (ref 8.5–10.1)
CHLORIDE SERPL-SCNC: 100 MMOL/L (ref 98–107)
CO2 SERPL-SCNC: 24 MMOL/L (ref 21–32)
CREAT SERPL-MCNC: 0.84 MG/DL (ref 0.6–1.3)
EOSINOPHIL # BLD AUTO: 0.7 10^3/UL (ref 0–0.3)
EOSINOPHIL NFR BLD AUTO: 9 % (ref 0–10)
GFR SERPLBLD BASED ON 1.73 SQ M-ARVRAT: 91 ML/MIN
GLUCOSE SERPL-MCNC: 86 MG/DL (ref 70–105)
HCT VFR BLD CALC: 50 % (ref 40–54)
HGB BLD-MCNC: 16.4 G/DL (ref 13.3–17.7)
LYMPHOCYTES # BLD AUTO: 1.6 10^3/UL (ref 1–4)
LYMPHOCYTES NFR BLD AUTO: 20 % (ref 12–44)
MAGNESIUM SERPL-MCNC: 2 MG/DL (ref 1.6–2.4)
MANUAL DIFFERENTIAL PERFORMED BLD QL: NO
MCH RBC QN AUTO: 30 PG (ref 25–34)
MCHC RBC AUTO-ENTMCNC: 33 G/DL (ref 32–36)
MCV RBC AUTO: 93 FL (ref 80–99)
MONOCYTES # BLD AUTO: 0.8 10^3/UL (ref 0–1)
MONOCYTES NFR BLD AUTO: 10 % (ref 0–12)
NEUTROPHILS # BLD AUTO: 4.8 10^3/UL (ref 1.8–7.8)
NEUTROPHILS NFR BLD AUTO: 60 % (ref 42–75)
PLATELET # BLD: 184 10^3/UL (ref 130–400)
PMV BLD AUTO: 11.4 FL (ref 9–12.2)
POTASSIUM SERPL-SCNC: 3.9 MMOL/L (ref 3.6–5)
SODIUM SERPL-SCNC: 136 MMOL/L (ref 135–145)
WBC # BLD AUTO: 7.9 10^3/UL (ref 4.3–11)

## 2022-11-30 RX ADMIN — MAGNESIUM SULFATE IN DEXTROSE SCH MLS/HR: 10 INJECTION, SOLUTION INTRAVENOUS at 07:55

## 2022-11-30 RX ADMIN — ALBUTEROL SULFATE SCH MG: 2.5 SOLUTION RESPIRATORY (INHALATION) at 14:29

## 2022-11-30 RX ADMIN — SENNOSIDES SCH MG: 8.6 TABLET, FILM COATED ORAL at 09:29

## 2022-11-30 RX ADMIN — ALBUTEROL SULFATE SCH MG: 2.5 SOLUTION RESPIRATORY (INHALATION) at 08:04

## 2022-11-30 RX ADMIN — POTASSIUM CHLORIDE SCH MEQ: 1500 TABLET, EXTENDED RELEASE ORAL at 07:55

## 2022-11-30 RX ADMIN — DOCUSATE SODIUM SCH MG: 100 CAPSULE ORAL at 09:29

## 2022-11-30 RX ADMIN — CARVEDILOL SCH MG: 25 TABLET, FILM COATED ORAL at 09:29

## 2022-11-30 RX ADMIN — ALBUTEROL SULFATE SCH MG: 2.5 SOLUTION RESPIRATORY (INHALATION) at 02:49

## 2022-11-30 RX ADMIN — POTASSIUM CHLORIDE SCH MLS/HR: 200 INJECTION, SOLUTION INTRAVENOUS at 07:55

## 2022-11-30 RX ADMIN — LEVOTHYROXINE SODIUM SCH MCG: 25 TABLET ORAL at 09:28

## 2022-11-30 NOTE — DISCHARGE SUMMARY
Diagnosis/Chief Complaint


Date of Admission


Nov 26, 2022 at 6:08 pm


Date of Discharge





Discharge Date:  Nov 30, 2022


Admission Diagnosis


Acute respiratory failure with hypoxia


Primary Care


No,Local Physician


Discharge Diagnosis





(1) Acute respiratory failure with hypoxia


Status:  Acute


(2) Pleural effusion, right


Status:  Acute


(3) COPD (chronic obstructive pulmonary disease)


Status:  Chronic


(4) HTN (hypertension)


Status:  Chronic


(5) Hypothyroidism


Status:  Chronic


(6) Obesity


Status:  Chronic


(7) GREYSON (obstructive sleep apnea)


Status:  Chronic





Discharge Summary


Discharge Physical Exam


Allergies:  


Coded Allergies:  


     No Known Drug Allergies (Unverified , 11/26/22)


Vitals & I&Os





Vital Signs








  Date Time  Temp Pulse Resp B/P (MAP) Pulse Ox O2 Delivery O2 Flow Rate FiO2


 


11/30/22 13:00  85      


 


11/30/22 11:34 36.9  16 106/61 (76) 91 Room Air  


 


11/30/22 03:11       32.00 


 


11/29/22 14:31        21








General Appearance:  No Apparent Distress, WD/WN


Respiratory:  Lungs Clear, No Respiratory Distress


Cardiovascular:  Regular Rate, Rhythm, No Murmur


Neurologic/Psychiatric:  Alert, Oriented x3





Hospital Course


Patient was admitted to the hospital secondary to pleural effusion.  Surgery was

consulted and performed thoracentesis with 2.1 L removed.  He did very well 

following this.  Cultures of the pleural fluid were negative and pathology was 

all for malignant cells.  He was able to be discharged home in stable and 

improved condition to follow-up with his primary care in [place], [place].  He 

was tested for oxygen prior to discharge and was found to have an exertional 

requirement which was arranged.


Labs (last 24 hrs)


Laboratory Tests


11/30/22 06:41: 


White Blood Count 7.9, Red Blood Count 5.44, Hemoglobin 16.4, Hematocrit 50, 

Mean Corpuscular Volume 93, Mean Corpuscular Hemoglobin 30, Mean Corpuscular 

Hemoglobin Concent 33, Red Cell Distribution Width 13.2, Platelet Count 184, 

Mean Platelet Volume 11.4, Immature Granulocyte % (Auto) 1, Neutrophils (%) (

Auto) 60, Lymphocytes (%) (Auto) 20, Monocytes (%) (Auto) 10, Eosinophils (%) 

(Auto) 9, Basophils (%) (Auto) 1, Neutrophils # (Auto) 4.8, Lymphocytes # (Auto)

1.6, Monocytes # (Auto) 0.8, Eosinophils # (Auto) 0.7H, Basophils # (Auto) 0.1, 

Immature Granulocyte # (Auto) 0.1, Sodium Level 136, Potassium Level 3.9, 

Chloride Level 100, Carbon Dioxide Level 24, Anion Gap 12, Blood Urea Nitrogen 

16, Creatinine 0.84, Estimat Glomerular Filtration Rate 91, BUN/Creatinine Ratio

19, Glucose Level 86, Calcium Level 8.7, Magnesium Level 2.0





Microbiology


11/26/22 MRSA Screen - Final, Complete


           MRSA not isolated


Patient resulted labs reviewed.


Pending Labs


Laboratory Tests


11/30/22 06:41: 


White Blood Count 7.9, Red Blood Count 5.44, Hemoglobin 16.4, Hematocrit 50, 

Mean Corpuscular Volume 93, Mean Corpuscular Hemoglobin 30, Mean Corpuscular 

Hemoglobin Concent 33, Red Cell Distribution Width 13.2, Platelet Count 184, Me

an Platelet Volume 11.4, Immature Granulocyte % (Auto) 1, Neutrophils (%) (Auto)

60, Lymphocytes (%) (Auto) 20, Monocytes (%) (Auto) 10, Eosinophils (%) (Auto) 

9, Basophils (%) (Auto) 1, Neutrophils # (Auto) 4.8, Lymphocytes # (Auto) 1.6, 

Monocytes # (Auto) 0.8, Eosinophils # (Auto) 0.7, Basophils # (Auto) 0.1, 

Immature Granulocyte # (Auto) 0.1, Sodium Level 136, Potassium Level 3.9, 

Chloride Level 100, Carbon Dioxide Level 24, Anion Gap 12, Blood Urea Nitrogen 

16, Creatinine 0.84, Estimat Glomerular Filtration Rate 91, BUN/Creatinine Ratio

19, Glucose Level 86, Calcium Level 8.7, Magnesium Level 2.0





Imaging:  Reviewed Imaging Films, Reviewed Imaging Report





Discussion & Recommendations


Discharge Planning:  >30 minutes discharge planning





Discharge


Home Medications:





Active Scripts


Active


Reported


Combivent Respimat Inhal Spray (Albuterol/Ipratropium) 20 Mcg-100 Mcg/Actuation 

Aero 1 Puff IH QID PRN


Atenolol 50 Mg Tablet 50 Mg PO DAILY


Levothyroxine Sodium 25 Mcg Tablet 25 Mcg PO DAILY





Instructions to patient/family


Please see electronic discharge instructions given to patient.











DEWAYNE WALSH MD             Nov 30, 2022 1:58 pm

## 2022-11-30 NOTE — PHYSICAL THERAPY DAILY NOTE
PT Daily Note-Current


Subjective


Patient in bed pre tx, agrees to PT, has no complaints of pain.





Pain


Section J - Health Conditions


1. Rarely or not at all


2. Occasionally


3. Frequently


4. Almost constantly


8. Unable to answer


Pain Effect on Sleep:  1


Pain Interference with Therapy:  1


Pain Interference w/Day-to-Day:  1





Appearance


Patient in bed post tx with nurse call, phone, tray, all needs met.





Mental Status


Patient Orientation:  Person, Place, Situation





Transfers


SCALE: Activities may be completed with or without assistive devices.





6-Indepedent-patient completes the activity by him/herself with no assistance 

from a helper.


5-Set-up or Clean-up Assistance-helper sets up or cleans up; patient completes 

activity. [place] assists only prior to or  


    following the activity.


4-Supervision or Touching Assistance-helper provides verbal cues and/or 

touching/steadying and/or contact guard assistance as patient completes 

activity. Assistance may be provided   


    throughout the activity or intermittently.


3-Partial/Moderate Assistance-helper does LESS THAN HALF the effort. [place] 

lifts, holds or supports trunk or limbs, but provides less than half the effort.


2-Substantial/Maximal Assistance-helper does MORE THAN HALF the effort. [place] 

lifts or holds trunk or limbs and provides more than half the effort.


2-[id number]-vulkqz does ALL the effort. Patient does none of the effort to 

complete the activity. Or, the assistance of 2 or more helpers is required for 

the patient to complete the  


    activity.


If activity was not attempted, code reason:


7-Patient Refused.


9-Not Applicable-not attempted and the patient did not perform the activity 

before the current illness, exacerbation or injury.


10-Not Attempted due to Environmental Limitations-(lack of equipment, weather 

restraints, etc.).


88-Not Attempted due to Medical Conditions or Safety Concerns.


Roll Left & Right (QC):  6


Sit to Lying (QC):  6


Lying to Sitting/Side of Bed(Q:  6


Sit to Stand (QC):  4


Chair/Bed-to-Chair Xfer(QC):  4


SBA for sit to stand and transfers





Weight Bearing


Right Lower Extremity:  Right


Full Weight Bearing


Left Lower Extremity:  Left


Full Weight Bearing





Gait Training


Distance:  200'


Walk 10 feet (QC):  4


Walk 50 ft with 2 Turns(QC):  4


Walk 150 ft (QC):  4


Gait Persons Needed:  1


Gait Assistive Device:  FWW


SBA, slow ambulation, didn't have a walker in his room and he requested one, 

more steady ambulation with walker





Exercises


Seated Therapy Exercises:  Ankle pumps, Long arc quads


Seated Reps:  20





Treatments


bed mobility and transfers, ambulation, LE ROM





Assessment


Current Status:  Fair Progress


improving endurance





PT Long Term Goals


Long Term Goals


PT Long Term Goals Time Frame:  Dec 31, 2022


Roll Left & Right (QC):  6


Sit to Lying (QC):  6


Lying-Sitting on Side/Bed(QC):  6


Sit to Stand (QC):  6


Chair/Bed-to-Chair Xfer(QC):  6


Toilet Transfer (QC):  6


Does the Patient Walk:  Yes


Walk 10 feet (QC):  6


Walk 50ft with 2 Turns (QC):  6


Walk 150 ft (QC):  6


1 Step (curb) (QC):  4


4 Steps (QC):  4


12 Steps (QC):  4





PT Plan


Problem List


Problem List:  Activity Tolerance, Functional Strength, Safety, Balance, Gait, 

Transfer, ROM





Treatment/Plan


Treatment Plan:  Continue Plan of Care


Treatment Plan:  Bed Mobility, Education, Functional Activity Alexandra, Functional 

Strength, Gait, Safety, Therapeutic Exercise, Transfers


Treatment Duration:  Dec 31, 2022


Frequency:  6 times per week


Estimated Hrs Per Day:  .25 hour per day


Patient and/or Family Agrees t:  Yes





Safety Risks/Education


Patient Education:  Gait Training, Transfer Techniques, Correct Positioning, 

Safety Issues


Teaching Recipient:  Patient


Teaching Methods:  Demonstration, Discussion


Response to Teaching:  Reinforcement Needed





Time


Time In:  0909


Time Out:  0920


DATE:  Nov 30, 2022


Total Billed Treatment Time:  11


Total Billed Treatment


1 visit


FA DELIA OLSEN PT                Nov 30, 2022 09:30

## 2022-11-30 NOTE — DISCHARGE INST-SIMPLE/STANDARD
Discharge Inst-Standard


Discharge Medications


New, Converted or Re-Newed RX:  Transmitted to Pharmacy





Patient Instructions/Follow Up


Plan of Care/Instructions/FU:  


Please continue to take your medications as written. Please follow up with


your primary care doctor to follow up this hospital stay.


Activity as Tolerated:  Yes


Discharge Diet:  No Restrictions


Return to The Hospital For:  


Chest pain, shortness of breath, fever, weakness, if you feel you are


getting worse.











DEWAYNE WALSH MD              Nov 30, 2022 09:14